# Patient Record
Sex: MALE | Race: BLACK OR AFRICAN AMERICAN | URBAN - METROPOLITAN AREA
[De-identification: names, ages, dates, MRNs, and addresses within clinical notes are randomized per-mention and may not be internally consistent; named-entity substitution may affect disease eponyms.]

---

## 2021-08-10 ENCOUNTER — HISTORICAL (OUTPATIENT)
Dept: ADMINISTRATIVE | Facility: HOSPITAL | Age: 26
End: 2021-08-10

## 2021-08-10 LAB — SARS-COV-2 RNA RESP QL NAA+PROBE: DETECTED

## 2024-06-13 ENCOUNTER — HOSPITAL ENCOUNTER (OUTPATIENT)
Dept: RADIOLOGY | Facility: HOSPITAL | Age: 29
Discharge: HOME | End: 2024-06-13
Attending: GENERAL PRACTICE
Payer: COMMERCIAL

## 2024-06-13 ENCOUNTER — OFFICE VISIT (OUTPATIENT)
Dept: INFECTIOUS DISEASES | Facility: CLINIC | Age: 29
End: 2024-06-13
Payer: COMMERCIAL

## 2024-06-13 ENCOUNTER — TELEPHONE (OUTPATIENT)
Dept: INFECTIOUS DISEASES | Facility: CLINIC | Age: 29
End: 2024-06-13

## 2024-06-13 VITALS
DIASTOLIC BLOOD PRESSURE: 84 MMHG | HEIGHT: 73 IN | SYSTOLIC BLOOD PRESSURE: 133 MMHG | WEIGHT: 221 LBS | HEART RATE: 80 BPM | RESPIRATION RATE: 18 BRPM | OXYGEN SATURATION: 98 % | BODY MASS INDEX: 29.29 KG/M2

## 2024-06-13 DIAGNOSIS — R05.9 COUGH, UNSPECIFIED TYPE: ICD-10-CM

## 2024-06-13 DIAGNOSIS — R05.8 SPUTUM PRODUCTION: ICD-10-CM

## 2024-06-13 DIAGNOSIS — Z21 NEWLY DIAGNOSED HIV-POSITIVE: Primary | ICD-10-CM

## 2024-06-13 DIAGNOSIS — Z11.3 ROUTINE SCREENING FOR STI (SEXUALLY TRANSMITTED INFECTION): ICD-10-CM

## 2024-06-13 DIAGNOSIS — Z21 NEWLY DIAGNOSED HIV-POSITIVE: ICD-10-CM

## 2024-06-13 DIAGNOSIS — B20 HIV INFECTION, UNSPECIFIED SYMPTOM STATUS: ICD-10-CM

## 2024-06-13 DIAGNOSIS — D69.6 THROMBOCYTOPENIA: Primary | ICD-10-CM

## 2024-06-13 PROCEDURE — 99205 OFFICE O/P NEW HI 60 MIN: CPT | Mod: S$GLB,,, | Performed by: GENERAL PRACTICE

## 2024-06-13 PROCEDURE — 3008F BODY MASS INDEX DOCD: CPT | Mod: CPTII,S$GLB,, | Performed by: GENERAL PRACTICE

## 2024-06-13 PROCEDURE — 1159F MED LIST DOCD IN RCRD: CPT | Mod: CPTII,S$GLB,, | Performed by: GENERAL PRACTICE

## 2024-06-13 PROCEDURE — 99999 PR PBB SHADOW E&M-EST. PATIENT-LVL IV: CPT | Mod: PBBFAC,,, | Performed by: GENERAL PRACTICE

## 2024-06-13 PROCEDURE — 71046 X-RAY EXAM CHEST 2 VIEWS: CPT | Mod: TC

## 2024-06-13 PROCEDURE — 87491 CHLMYD TRACH DNA AMP PROBE: CPT | Performed by: GENERAL PRACTICE

## 2024-06-13 PROCEDURE — 3079F DIAST BP 80-89 MM HG: CPT | Mod: CPTII,S$GLB,, | Performed by: GENERAL PRACTICE

## 2024-06-13 PROCEDURE — 3075F SYST BP GE 130 - 139MM HG: CPT | Mod: CPTII,S$GLB,, | Performed by: GENERAL PRACTICE

## 2024-06-13 RX ORDER — SULFAMETHOXAZOLE AND TRIMETHOPRIM 800; 160 MG/1; MG/1
1 TABLET ORAL
COMMUNITY
Start: 2024-05-28 | End: 2024-06-13 | Stop reason: ALTCHOICE

## 2024-06-13 RX ORDER — AZITHROMYCIN 250 MG/1
TABLET, FILM COATED ORAL
COMMUNITY
Start: 2024-05-23 | End: 2024-06-13 | Stop reason: ALTCHOICE

## 2024-06-13 RX ORDER — AMOXICILLIN 500 MG/1
TABLET, FILM COATED ORAL
COMMUNITY
End: 2024-06-13 | Stop reason: ALTCHOICE

## 2024-06-13 RX ORDER — PREDNISONE 20 MG/1
20 TABLET ORAL EVERY MORNING
COMMUNITY
Start: 2024-02-09 | End: 2024-06-13 | Stop reason: ALTCHOICE

## 2024-06-13 RX ORDER — DOXYCYCLINE HYCLATE 100 MG
TABLET ORAL
COMMUNITY
Start: 2024-02-09 | End: 2024-06-13 | Stop reason: ALTCHOICE

## 2024-06-13 RX ORDER — MUPIROCIN 20 MG/G
OINTMENT TOPICAL
COMMUNITY
End: 2024-06-13 | Stop reason: ALTCHOICE

## 2024-06-13 RX ORDER — AMOXICILLIN AND CLAVULANATE POTASSIUM 875; 125 MG/1; MG/1
TABLET, FILM COATED ORAL
COMMUNITY
Start: 2024-05-01 | End: 2024-06-13 | Stop reason: ALTCHOICE

## 2024-06-13 RX ORDER — METHYLPREDNISOLONE 4 MG/1
TABLET ORAL
COMMUNITY
Start: 2024-05-14 | End: 2024-06-13 | Stop reason: ALTCHOICE

## 2024-06-13 RX ORDER — OXYCODONE AND ACETAMINOPHEN 5; 325 MG/1; MG/1
1 TABLET ORAL
COMMUNITY
Start: 2024-05-20 | End: 2024-06-13 | Stop reason: ALTCHOICE

## 2024-06-13 NOTE — PROGRESS NOTES
Subjective:       Patient ID: Khris Covington 29 y.o.     Chief Complaint:   Chief Complaint   Patient presents with    NP/Referral...B20    HIV Positive/AIDS     Patient states he is fatigue, coughing, possible temp lastnight(temp unknown) and having body aches.         HPI:  06/13/2024  29-year-old male patient no significant past medical history presented to his outpatient primary care on 05/23/2024 after recurrent episodes of sinusitis and an ear infection in the past year, was seen for increased pressure and pain in the head and neck as well as clear liquid drip from the nose after having teeth pulled 05/20/2024, he had been recently noted to have pancytopenia on repeat labs, HIV was ordered, found to have a positive screen is referred to us for further evaluation and management.    Was previously tested in 03/2017 that was a negative test for concerns about his previous partner. In 2019, he had a rash on the genital area and had blood work at that time showed leukopenia and got tested for HIV for concerns about different partner, his test was negative then as well.     Has been with current wife for the past 4 years. Last time they had unprotected sex was about 3-4 weeks ago, wife was tested 2 weeks ago with negative test and again last week and awaiting results.     Works offshore. Smokes a cigarette or a cigar on rare occasions, drinks alcohol occasionally, no previous IVDU, no current drug use.     Previously had multiple female partners, no male partners.     Currently feeling fatigued, generalized pains, he has pain on the R lower jaw, had L sided lower teeth pulled. Ear pain has resolved. No sore throat, no shortness of breath, no chest pain, no burning with urination.      Has a cough, with mucus production with dark-green color, he is having diarrhea started this morning, loose stools, does also report diffuse joint pains, lost about 10 lbs in the past month but no weight loss in the past 6 months, has  "been having night sweats for the past 2-3 weeks.      He has been taking TMP/SMX daily for the past 2 weeks.    Past Medical History:   Diagnosis Date    ADHD (attention deficit hyperactivity disorder)     Anemia     HIV infection         History reviewed. No pertinent surgical history.     Social History     Socioeconomic History    Marital status:    Tobacco Use    Smoking status: Some Days     Types: Cigars    Smokeless tobacco: Former   Substance and Sexual Activity    Alcohol use: Never    Drug use: Never    Sexual activity: Yes        Family History   Problem Relation Name Age of Onset    Kidney disease Maternal Grandfather          Review of patient's allergies indicates:  No Known Allergies       There is no immunization history on file for this patient.     Review of Systems   All other systems reviewed and are negative.         Objective:      /84 (BP Location: Left arm, Patient Position: Sitting)   Pulse 80   Resp 18   Ht 6' 1" (1.854 m)   Wt 100.2 kg (221 lb)   SpO2 98%   BMI 29.16 kg/m²      Physical Exam  Constitutional:       Appearance: Normal appearance.   HENT:      Head: Normocephalic and atraumatic.      Mouth/Throat:      Pharynx: No oropharyngeal exudate or posterior oropharyngeal erythema.   Eyes:      Extraocular Movements: Extraocular movements intact.      Pupils: Pupils are equal, round, and reactive to light.   Cardiovascular:      Rate and Rhythm: Normal rate and regular rhythm.      Heart sounds: No murmur heard.  Pulmonary:      Effort: No respiratory distress.      Breath sounds: No wheezing, rhonchi or rales.   Abdominal:      General: Bowel sounds are normal. There is no distension.      Palpations: Abdomen is soft.      Tenderness: There is no abdominal tenderness. There is no right CVA tenderness or left CVA tenderness.   Musculoskeletal:         General: No swelling or tenderness.      Cervical back: Neck supple. No rigidity or tenderness.   Lymphadenopathy:    "   Cervical: No cervical adenopathy.   Skin:     Findings: No lesion or rash.   Neurological:      General: No focal deficit present.      Mental Status: He is alert and oriented to person, place, and time. Mental status is at baseline.      Cranial Nerves: No cranial nerve deficit.      Motor: No weakness.   Psychiatric:         Mood and Affect: Mood normal.         Behavior: Behavior normal.          Labs: Reviewed most recent relevant labs available, notable results highlighted in this note    Imaging: Reviewed most recent relevant imaging studies available, notable results highlighted in this note    Assessment:       Problem List Items Addressed This Visit          Pulmonary    Cough    Relevant Orders    CBC Auto Differential    Comprehensive Metabolic Panel (Completed)    CD4 T-[place] Cells    Hepatitis A antibody, IgG    Hepatitis A Antibody, IgM    Hepatitis B Core Antibody, IgM    Hepatitis B Core Antibody, Total    Hepatitis B Surface Ab, Qualitative    Hepatitis B Surface Antigen    Hepatitis C Antibody    HIV-1 RNA, Quantitative, PCR with Reflex to Genotype    Quantiferon Gold TB    SYPHILIS ANTIBODY (WITH REFLEX RPR) (Completed)    X-Ray Chest PA And Lateral (Completed)    Miscellaneous Test, Sendout PJP PCR    Fungitell Assay For (1.3)-B-D-Glucans    Cryptococcal antigen, blood    C.trach/N.gonor AMP RNA    C.trach/N.gonor AMP RNA    Respiratory Culture    Sputum production    Relevant Orders    CBC Auto Differential    Comprehensive Metabolic Panel (Completed)    CD4 T-[place] Cells    Hepatitis A antibody, IgG    Hepatitis A Antibody, IgM    Hepatitis B Core Antibody, IgM    Hepatitis B Core Antibody, Total    Hepatitis B Surface Ab, Qualitative    Hepatitis B Surface Antigen    Hepatitis C Antibody    HIV-1 RNA, Quantitative, PCR with Reflex to Genotype    Quantiferon Gold TB    SYPHILIS ANTIBODY (WITH REFLEX RPR) (Completed)    X-Ray Chest PA And Lateral (Completed)    Miscellaneous Test, Sendout  PJP PCR    Fungitell Assay For (1.3)-B-D-Glucans    Cryptococcal antigen, blood    C.trach/N.gonor AMP RNA    C.trach/N.gonor AMP RNA    Respiratory Culture       ID    Newly diagnosed HIV-positive - Primary    Relevant Medications    dvbjagsef-tdeazfbo-kqjlvln ala (BIKTARVY) -25 mg (25 kg or greater)    Other Relevant Orders    CBC Auto Differential    Comprehensive Metabolic Panel (Completed)    CD4 T-[place] Cells    Hepatitis A antibody, IgG    Hepatitis A Antibody, IgM    Hepatitis B Core Antibody, IgM    Hepatitis B Core Antibody, Total    Hepatitis B Surface Ab, Qualitative    Hepatitis B Surface Antigen    Hepatitis C Antibody    HIV-1 RNA, Quantitative, PCR with Reflex to Genotype    Quantiferon Gold TB    SYPHILIS ANTIBODY (WITH REFLEX RPR) (Completed)    X-Ray Chest PA And Lateral (Completed)    Miscellaneous Test, Sendout PJP PCR    Fungitell Assay For (1.3)-B-D-Glucans    Cryptococcal antigen, blood    C.trach/N.gonor AMP RNA    C.trach/N.gonor AMP RNA    Respiratory Culture    Routine screening for STI (sexually transmitted infection)    Relevant Orders    CBC Auto Differential    Comprehensive Metabolic Panel (Completed)    CD4 T-[place] Cells    Hepatitis A antibody, IgG    Hepatitis A Antibody, IgM    Hepatitis B Core Antibody, IgM    Hepatitis B Core Antibody, Total    Hepatitis B Surface Ab, Qualitative    Hepatitis B Surface Antigen    Hepatitis C Antibody    HIV-1 RNA, Quantitative, PCR with Reflex to Genotype    Quantiferon Gold TB    SYPHILIS ANTIBODY (WITH REFLEX RPR) (Completed)    X-Ray Chest PA And Lateral (Completed)    Miscellaneous Test, Sendout PJP PCR    Fungitell Assay For (1.3)-B-D-Glucans    C.trach/N.gonor AMP RNA    C.trach/N.gonor AMP RNA    Respiratory Culture    HIV infection          Plan:         Diagnosed: 05/2024  Risk Factor: unprotected sex with female partner     HIV VL: Pending  CD4: 98 05/2024  Shahab: 98 05/2024  ART: Not started yet  OI Prophylaxis: TMP/SMX per  primary care    Screenings: Labs repeated  Hep A:   Hep B:   Hep C: Negative 05/2024  Treponemal Ab: Negative 05/2024  RPR: N/a 05/2024  Urine G/C: Negative 05/2024  Throat G/C:   Rectal G/C:   Quantiferon:   Crypto Ag:   G6PD: N/A  HLA B*5701: N/A      Non-infectious screening:  Dexa-Scan: N/A  Lipid profile: N/a  HbA1c: N/A     Vaccinations: discussed, will initiate at next visit  Flu:   PPSV23:   PCV20:   TDap:   HPV:   Meningitis:   COVID-19:   Hepatitis A:   Hepatitis B:   VZV:   Monkeypox:     Other:  -discussed with the patient and his wife who is accompanying him today at length, details of HIV, pathophysiology, living well with HIV, importance of medication adherence, importance of obtaining undetectable levels, best ways to prevent spread of infection   -will obtain labs as above   -prescribed Biktarvy, will need repeat labs in 2-4 weeks after initiation of medication  -discussed potential side of    Needed at next visit:  -repeat labs, discussion above vaccination initiation of vaccination series    Counseling:   Counseled on safe sex practices and using protection at all times and disclosing status to all contacts  Counseled on importance of 100% adherence to medication and risk of resistance development        Follow up in about 4 weeks (around 7/11/2024).    Portions of this note dictated using EMR integrated voice recognition software, and may be subject to voice recognition errors not corrected at proofreading. Please contact writer for clarification if needed.    65 minutes of total time spent on the encounter, which includes face to face time and non-face to face time preparing to see the patient (eg, review of tests), Obtaining and/or reviewing separately obtained history, Documenting clinical information in the electronic or other health record, Independently interpreting results (not separately reported) and communicating results to the patient/family/caregiver, or Care coordination (not  separately reported).

## 2024-06-13 NOTE — TELEPHONE ENCOUNTER
Noted critical lab platelets of 97831, possible ITP in the setting of HIV infection.  Patient works offshore.  I called him back, discussed those results with him, discussed high risk of bleeding associated with this low-level of platelets.  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  Given his low platelets, although ITP is a possibility, other potential etiologies are possible/likely, including other opportunistic infections, waiting for additional testing that was sent, I would be concerned about his reaction to starting Biktarvy as well.  As long as cryptococcal antigen is negative and QuantiFERON is negative, would be reasonable to start ART, especially with a CD4 of 98. prior Auth has been initiated.  I stressed the importance of weekly labs while his platelets are this low and avoiding strenuous activities or activities with high potential for injury, presenting to care after any fall, especially if involving head trauma, and advised again against going back to work offshore until we have a better control on his current condition.

## 2024-06-14 LAB
C TRACH RRNA SPEC QL NAA+PROBE: NEGATIVE
N GONORRHOEA RRNA SPEC QL NAA+PROBE: NEGATIVE
SPECIMEN SOURCE: NORMAL
SPECIMEN SOURCE: NORMAL

## 2024-06-17 NOTE — PROGRESS NOTES
Subjective:       Patient ID: Khris Covington is a 29 y.o. male.    Chief Complaint: Follow-up (Patient is here for a new patient visit)      Referring Provider: Dr. Winston  Diagnosis: Pancytopenia      Clinical History:  Patient referred for thrombocytopenia. He presented to his PCP on 5/23/24 for recurrent sinusitis and ear infection over the past year. He was noted to have pancytopenia. Further workup, he was found to be HIV positive. He was referred to infectious disease.     He complains of fatigue, generalized myalgias. Patient denies male partners, IV drug use. He admits to constant cough with mucous production. He was prescribed Biktary on 6/13/24.   CBC reviewed from 6/13/24  Lab Results   Component Value Date    WBC 2.71 (L) 06/13/2024    HGB 12.1 (L) 06/13/2024    HCT 41.1 (L) 06/13/2024    MCV 76.1 (L) 06/13/2024    PLT 36 (LL) 06/13/2024           Interval History:   06/18/2024   Presents for initial consultation for pancytopenia.   Past Medical History:   Diagnosis Date    ADHD (attention deficit hyperactivity disorder)     Anemia     HIV infection     Newly diagnosed HIV-positive 06/13/2024      Past Surgical History:   Procedure Laterality Date    WISDOM TOOTH EXTRACTION       Social History     Socioeconomic History    Marital status:    Tobacco Use    Smoking status: Former     Types: Cigars    Smokeless tobacco: Former   Substance and Sexual Activity    Alcohol use: Yes     Comment: ocassional    Drug use: Never    Sexual activity: Yes      Family History   Problem Relation Name Age of Onset    Kidney disease Maternal Grandfather        Review of patient's allergies indicates:  No Known Allergies     Current Outpatient Medications:     glrkatlkl-opzpoasn-hhypnqm ala (BIKTARVY) -25 mg (25 kg or greater), Take 1 tablet by mouth once daily., Disp: 30 tablet, Rfl: 5    sulfamethoxazole-trimethoprim 800-160mg (BACTRIM DS) 800-160 mg Tab, Take 1 tablet by mouth every 12 (twelve) hours.,  Disp: , Rfl:         Review of Systems   Constitutional:  Negative for appetite change and unexpected weight change.   HENT:  Negative for mouth sores.    Eyes:  Negative for visual disturbance.   Respiratory:  Positive for cough. Negative for shortness of breath.    Cardiovascular:  Negative for chest pain.   Gastrointestinal:  Positive for diarrhea. Negative for abdominal pain.   Genitourinary:  Negative for frequency.   Musculoskeletal:  Negative for back pain.   Skin:  Negative for rash.   Neurological:  Negative for headaches.   Hematological:  Negative for adenopathy.   Psychiatric/Behavioral:  The patient is not nervous/anxious.     A complete 12 point ROS was performed in full with pertinent positives as described in interval history. Remainder of ROS done in full and are negative.        Objective:      There were no vitals filed for this visit.   Physical Exam  Constitutional:       Appearance: Normal appearance.   HENT:      Head: Normocephalic.      Nose: Nose normal.      Mouth/Throat:      Mouth: Mucous membranes are moist.   Eyes:      Extraocular Movements: Extraocular movements intact.      Pupils: Pupils are equal, round, and reactive to light.   Cardiovascular:      Rate and Rhythm: Normal rate and regular rhythm.   Pulmonary:      Effort: Pulmonary effort is normal.      Breath sounds: Normal breath sounds.   Abdominal:      General: Bowel sounds are normal.      Palpations: Abdomen is soft.   Musculoskeletal:         General: Normal range of motion.      Cervical back: Normal range of motion and neck supple.   Skin:     General: Skin is warm and dry.   Neurological:      General: No focal deficit present.      Mental Status: He is alert and oriented to person, place, and time. Mental status is at baseline.   Psychiatric:         Mood and Affect: Mood normal.         Behavior: Behavior normal.     LABS AND IMAGING REVIEWED IN [facility]  Lab Review:  Results for orders placed or performed in visit on  06/13/24   Cryptococcal antigen, blood    Specimen: Blood, Venous   Result Value Ref Range    Cryptococcal Antigen, Serum Negative Negative   C.trach/N.gonor AMP RNA   Result Value Ref Range    Source Throat     Chlamydia trachomatis amplified RNA Negative Negative    Neisseria gonorrhoeae amplified RNA Negative Negative    SOURCE: Throat    Respiratory Culture    Specimen: Sputum   Result Value Ref Range    Respiratory Culture Normal respiratory danna    Comprehensive Metabolic Panel   Result Value Ref Range    Sodium 137 136 - 145 mmol/L    Potassium 4.5 3.5 - 5.1 mmol/L    Chloride 103 98 - 107 mmol/L    CO2 28 22 - 29 mmol/L    Glucose 96 74 - 100 mg/dL    Blood Urea Nitrogen 17.3 8.9 - 20.6 mg/dL    Creatinine 1.03 0.73 - 1.18 mg/dL    Calcium 8.9 8.4 - 10.2 mg/dL    Protein Total 8.3 6.4 - 8.3 gm/dL    Albumin 3.5 3.5 - 5.0 g/dL    Globulin 4.8 (H) 2.4 - 3.5 gm/dL    Albumin/Globulin Ratio 0.7 (L) 1.1 - 2.0 ratio    Bilirubin Total 0.1 <=1.5 mg/dL    ALP 66 40 - 150 unit/L    ALT 20 0 - 55 unit/L    AST 26 5 - 34 unit/L    eGFR >60 mL/min/1.73/m2    Anion Gap 6.0 mEq/L    BUN/Creatinine Ratio 17    CD4 T-[place] Cells   Result Value Ref Range    CD45 Total Lymph Count 0.34 (L) 0.82 - 2.84 thou/mcL    % CD3 (T Cells) 68 58 - 86 %    CD3 (T Cells) 229 (L) 550 - 2202 cells/mcL    % CD4 (T Cells) 19 (L) 32 - 64 %    CD4 (T Cells) 65 (L) 365 - 1437 cells/mcL    % CD8 (T Cells) 46 (H) 18 - 40 %    CD8 (T Cells) 154 (L) 199 - 846 cells/mcL    4/8 Ratio 0.4 (L) >=0.9   Hepatitis A antibody, IgG   Result Value Ref Range    Hep A IgG Interp Reactive (A) Nonreactive   Hepatitis A Antibody, IgM   Result Value Ref Range    Hep A IgM Interp Nonreactive Nonreactive   Hepatitis B Core Antibody, IgM   Result Value Ref Range    Hep B Core IgM Interp Nonreactive Nonreactive   Hepatitis B Core Antibody, Total   Result Value Ref Range    Hep BcAb Interp Nonreactive Nonreactive   Hepatitis B Surface Ab, Qualitative   Result Value  Ref Range    Hep BsAb Interp Nonreactive Nonreactive    Hep BsAb 0.59 mIU/mL   Hepatitis B Surface Antigen   Result Value Ref Range    Hep BsAg Interp Nonreactive Nonreactive   Hepatitis C Antibody   Result Value Ref Range    Hep C Ab Interp Nonreactive Nonreactive   HIV-1 RNA, Quantitative, PCR with Reflex to Genotype   Result Value Ref Range    HIV-1 RNA Detect/Quant, P 848256 (A) Undetected copies/mL   Quantiferon Gold TB   Result Value Ref Range    QuantiFERON-Tb Gold Plus Result Negative Negative    TB1 Ag minus Nil Result 0.08 IU/mL    TB2 Ag minus Nil Result 0.11 IU/mL    Mitogen minus Nil Result 7.92 IU/mL    Nil Result 1.43 IU/mL   SYPHILIS ANTIBODY (WITH REFLEX RPR)   Result Value Ref Range    Syphilis Antibody Nonreactive Nonreactive, Equivocal   Fungitell Assay For (1.3)-B-D-Glucans   Result Value Ref Range    Fungitell Quantitative Value <31 <60 pg/mL pg/mL    Fungitell Qualitative Result Negative Negative   CBC with Differential   Result Value Ref Range    WBC 2.71 (L) 4.50 - 11.50 x10(3)/mcL    RBC 5.40 4.70 - 6.10 x10(6)/mcL    Hgb 12.1 (L) 14.0 - 18.0 g/dL    Hct 41.1 (L) 42.0 - 52.0 %    MCV 76.1 (L) 80.0 - 94.0 fL    MCH 22.4 (L) 27.0 - 31.0 pg    MCHC 29.4 (L) 33.0 - 36.0 g/dL    RDW 13.9 11.5 - 17.0 %    Platelet 36 (LL) 130 - 400 x10(3)/mcL    MPV      Neut % 65.3 %    Lymph % 16.6 %    Mono % 8.5 %    Eos % 9.2 %    Basophil % 0.4 %    Lymph # 0.45 (L) 0.6 - 4.6 x10(3)/mcL    Neut # 1.77 (L) 2.1 - 9.2 x10(3)/mcL    Mono # 0.23 0.1 - 1.3 x10(3)/mcL    Eos # 0.25 0 - 0.9 x10(3)/mcL    Baso # 0.01 <=0.2 x10(3)/mcL    IG# 0.00 0 - 0.04 x10(3)/mcL    IG% 0.0 %    NRBC% 0.0 %    IPF 8.5 0.9 - 11.2 %   Blood Smear Microscopic Exam   Result Value Ref Range    RBC Morph Abnormal (A) Normal    Platelets Decreased (A) Normal, Adequate          Assessment:   Pancytopenia  HIV/AIDS, recently started on Bkitarvy on 6/13/24. Followed by Dr. Winston.     He has know history of anemia in past--family with  trait  Ddx -discussed----HIV, autoimmune, other infectious etiologies, underlying bone marrow disorder, vitamin deficiencies   Bleeding discussed  IVIG  if  worse  Consider bone marrow biopsy      Plan:   Labs today  Orders Placed This Encounter    CBC Auto Differential    Ferritin    Folate    Iron and TIBC    Reticulocytes    Vitamin B12    Lactate Dehydrogenase    Platelet Autoantibody, Cell Bound    Hemoglobin Electrophoresis Evaluation, Blood    Path Review, Peripheral Smear      Follow up in about 6 weeks (around 7/30/2024).    If bleeding consider platelet transfusion and/or IVIG.  Await above workup and follow.  Expect recovery once his HIV recovers.    Risk of nonsteroidals.    Wait until platelets> 50,000 for elective procedures      Syed Arias MD      30 minutes was spent with the patient today, 15 minutes spent in reviewing his labs and outside records, 10 minutes in documentation

## 2024-06-18 ENCOUNTER — OFFICE VISIT (OUTPATIENT)
Dept: HEMATOLOGY/ONCOLOGY | Facility: CLINIC | Age: 29
End: 2024-06-18
Payer: COMMERCIAL

## 2024-06-18 DIAGNOSIS — R05.2 SUBACUTE COUGH: ICD-10-CM

## 2024-06-18 DIAGNOSIS — Z21 NEWLY DIAGNOSED HIV-POSITIVE: ICD-10-CM

## 2024-06-18 DIAGNOSIS — D61.818 PANCYTOPENIA: Primary | ICD-10-CM

## 2024-06-18 DIAGNOSIS — D69.6 THROMBOCYTOPENIA: ICD-10-CM

## 2024-06-18 LAB
BASOPHILS # BLD AUTO: 0.02 X10(3)/MCL
BASOPHILS NFR BLD AUTO: 0.9 %
EOSINOPHIL # BLD AUTO: 0.38 X10(3)/MCL (ref 0–0.9)
EOSINOPHIL NFR BLD AUTO: 17.1 %
ERYTHROCYTE [DISTWIDTH] IN BLOOD BY AUTOMATED COUNT: 13.5 % (ref 11.5–17)
FERRITIN SERPL-MCNC: 274.97 NG/ML (ref 21.81–274.66)
FOLATE SERPL-MCNC: 8.2 NG/ML (ref 7–31.4)
HCT VFR BLD AUTO: 36.7 % (ref 42–52)
HGB BLD-MCNC: 11 G/DL (ref 14–18)
IMM GRANULOCYTES # BLD AUTO: 0 X10(3)/MCL (ref 0–0.04)
IMM GRANULOCYTES NFR BLD AUTO: 0 %
IRON SATN MFR SERPL: 25 % (ref 20–50)
IRON SERPL-MCNC: 62 UG/DL (ref 65–175)
LDH SERPL-CCNC: 208 U/L (ref 125–220)
LYMPHOCYTES # BLD AUTO: 0.69 X10(3)/MCL (ref 0.6–4.6)
LYMPHOCYTES NFR BLD AUTO: 31.1 %
MCH RBC QN AUTO: 22.5 PG (ref 27–31)
MCHC RBC AUTO-ENTMCNC: 30 G/DL (ref 33–36)
MCV RBC AUTO: 75.1 FL (ref 80–94)
MONOCYTES # BLD AUTO: 0.34 X10(3)/MCL (ref 0.1–1.3)
MONOCYTES NFR BLD AUTO: 15.3 %
NEUTROPHILS # BLD AUTO: 0.79 X10(3)/MCL (ref 2.1–9.2)
NEUTROPHILS NFR BLD AUTO: 35.6 %
PLATELET # BLD AUTO: 139 X10(3)/MCL (ref 130–400)
PMV BLD AUTO: 8.8 FL (ref 7.4–10.4)
RBC # BLD AUTO: 4.89 X10(6)/MCL (ref 4.7–6.1)
RET# (OHS): 0.03 X10E6/UL (ref 0.03–0.1)
RETICULOCYTE COUNT AUTOMATED (OLG): 0.66 % (ref 1.1–2.1)
TIBC SERPL-MCNC: 191 UG/DL (ref 69–240)
TIBC SERPL-MCNC: 253 UG/DL (ref 250–450)
TRANSFERRIN SERPL-MCNC: 231 MG/DL (ref 174–364)
VIT B12 SERPL-MCNC: 502 PG/ML (ref 213–816)
WBC # BLD AUTO: 2.22 X10(3)/MCL (ref 4.5–11.5)

## 2024-06-18 PROCEDURE — 99204 OFFICE O/P NEW MOD 45 MIN: CPT | Mod: S$GLB,,, | Performed by: INTERNAL MEDICINE

## 2024-06-18 PROCEDURE — 85045 AUTOMATED RETICULOCYTE COUNT: CPT | Performed by: INTERNAL MEDICINE

## 2024-06-18 PROCEDURE — 82607 VITAMIN B-12: CPT | Performed by: INTERNAL MEDICINE

## 2024-06-18 PROCEDURE — 99999 PR PBB SHADOW E&M-EST. PATIENT-LVL III: CPT | Mod: PBBFAC,,, | Performed by: INTERNAL MEDICINE

## 2024-06-18 PROCEDURE — 86023 IMMUNOGLOBULIN ASSAY: CPT | Performed by: INTERNAL MEDICINE

## 2024-06-18 PROCEDURE — 1160F RVW MEDS BY RX/DR IN RCRD: CPT | Mod: CPTII,S$GLB,, | Performed by: INTERNAL MEDICINE

## 2024-06-18 PROCEDURE — 83020 HEMOGLOBIN ELECTROPHORESIS: CPT | Performed by: INTERNAL MEDICINE

## 2024-06-18 PROCEDURE — 85025 COMPLETE CBC W/AUTO DIFF WBC: CPT | Performed by: INTERNAL MEDICINE

## 2024-06-18 PROCEDURE — 83615 LACTATE (LD) (LDH) ENZYME: CPT | Performed by: INTERNAL MEDICINE

## 2024-06-18 PROCEDURE — 82746 ASSAY OF FOLIC ACID SERUM: CPT | Performed by: INTERNAL MEDICINE

## 2024-06-18 PROCEDURE — 36415 COLL VENOUS BLD VENIPUNCTURE: CPT | Performed by: INTERNAL MEDICINE

## 2024-06-18 PROCEDURE — 82728 ASSAY OF FERRITIN: CPT | Performed by: INTERNAL MEDICINE

## 2024-06-18 PROCEDURE — 1159F MED LIST DOCD IN RCRD: CPT | Mod: CPTII,S$GLB,, | Performed by: INTERNAL MEDICINE

## 2024-06-18 PROCEDURE — 83021 HEMOGLOBIN CHROMOTOGRAPHY: CPT | Performed by: INTERNAL MEDICINE

## 2024-06-18 PROCEDURE — 83540 ASSAY OF IRON: CPT | Performed by: INTERNAL MEDICINE

## 2024-06-18 RX ORDER — SULFAMETHOXAZOLE AND TRIMETHOPRIM 800; 160 MG/1; MG/1
1 TABLET ORAL
COMMUNITY

## 2024-06-19 LAB — HEMATOLOGIST REVIEW: NORMAL

## 2024-06-19 NOTE — PROGRESS NOTES
Please inform patient his platelets are normal now. Please start Biktarvy and repeat CBC, CMP, HIV viral load in 1-2 weeks after starting. Thank you

## 2024-06-20 LAB
HGB A MFR BLD ELPH: 97.6 % (ref 95.8–98)
HGB A2 MFR BLD ELPH: 2.4 % (ref 2–3.3)
HGB F MFR BLD ELPH: 0 % (ref 0–0.9)
HGB FRACT BLD ELPH-IMP: NORMAL
M HPLC HB VARIANT, B: NORMAL
PA IGG BLD QL FC: NEGATIVE
PA IGM BLD QL FC: POSITIVE

## 2024-06-24 ENCOUNTER — TELEPHONE (OUTPATIENT)
Dept: INFECTIOUS DISEASES | Facility: CLINIC | Age: 29
End: 2024-06-24
Payer: COMMERCIAL

## 2024-06-24 ENCOUNTER — LAB VISIT (OUTPATIENT)
Dept: LAB | Facility: HOSPITAL | Age: 29
End: 2024-06-24
Attending: GENERAL PRACTICE
Payer: COMMERCIAL

## 2024-06-24 DIAGNOSIS — Z21 NEWLY DIAGNOSED HIV-POSITIVE: ICD-10-CM

## 2024-06-24 DIAGNOSIS — Z21 NEWLY DIAGNOSED HIV-POSITIVE: Primary | ICD-10-CM

## 2024-06-24 LAB
ALBUMIN SERPL-MCNC: 3.7 G/DL (ref 3.5–5)
ALBUMIN/GLOB SERPL: 0.8 RATIO (ref 1.1–2)
ALP SERPL-CCNC: 68 UNIT/L (ref 40–150)
ALT SERPL-CCNC: 27 UNIT/L (ref 0–55)
ANION GAP SERPL CALC-SCNC: 7 MEQ/L
AST SERPL-CCNC: 21 UNIT/L (ref 5–34)
BASOPHILS # BLD AUTO: 0.03 X10(3)/MCL
BASOPHILS NFR BLD AUTO: 0.9 %
BILIRUB SERPL-MCNC: 0.2 MG/DL
BUN SERPL-MCNC: 16.8 MG/DL (ref 8.9–20.6)
CALCIUM SERPL-MCNC: 9.3 MG/DL (ref 8.4–10.2)
CHLORIDE SERPL-SCNC: 104 MMOL/L (ref 98–107)
CO2 SERPL-SCNC: 27 MMOL/L (ref 22–29)
CREAT SERPL-MCNC: 1.33 MG/DL (ref 0.73–1.18)
CREAT/UREA NIT SERPL: 13
EOSINOPHIL # BLD AUTO: 0.26 X10(3)/MCL (ref 0–0.9)
EOSINOPHIL NFR BLD AUTO: 8.2 %
ERYTHROCYTE [DISTWIDTH] IN BLOOD BY AUTOMATED COUNT: 14.4 % (ref 11.5–17)
GFR SERPLBLD CREATININE-BSD FMLA CKD-EPI: >60 ML/MIN/1.73/M2
GLOBULIN SER-MCNC: 4.4 GM/DL (ref 2.4–3.5)
GLUCOSE SERPL-MCNC: 92 MG/DL (ref 74–100)
HCT VFR BLD AUTO: 37.6 % (ref 42–52)
HGB BLD-MCNC: 11.5 G/DL (ref 14–18)
IMM GRANULOCYTES # BLD AUTO: 0.01 X10(3)/MCL (ref 0–0.04)
IMM GRANULOCYTES NFR BLD AUTO: 0.3 %
LYMPHOCYTES # BLD AUTO: 0.9 X10(3)/MCL (ref 0.6–4.6)
LYMPHOCYTES NFR BLD AUTO: 28.5 %
MCH RBC QN AUTO: 22.3 PG (ref 27–31)
MCHC RBC AUTO-ENTMCNC: 30.6 G/DL (ref 33–36)
MCV RBC AUTO: 72.9 FL (ref 80–94)
MONOCYTES # BLD AUTO: 0.3 X10(3)/MCL (ref 0.1–1.3)
MONOCYTES NFR BLD AUTO: 9.5 %
NEUTROPHILS # BLD AUTO: 1.66 X10(3)/MCL (ref 2.1–9.2)
NEUTROPHILS NFR BLD AUTO: 52.6 %
NRBC BLD AUTO-RTO: 0 %
PLATELET # BLD AUTO: 237 X10(3)/MCL (ref 130–400)
PMV BLD AUTO: 9.2 FL (ref 7.4–10.4)
POTASSIUM SERPL-SCNC: 4.3 MMOL/L (ref 3.5–5.1)
PROT SERPL-MCNC: 8.1 GM/DL (ref 6.4–8.3)
RBC # BLD AUTO: 5.16 X10(6)/MCL (ref 4.7–6.1)
SODIUM SERPL-SCNC: 138 MMOL/L (ref 136–145)
WBC # BLD AUTO: 3.16 X10(3)/MCL (ref 4.5–11.5)

## 2024-06-24 PROCEDURE — 87536 HIV-1 QUANT&REVRSE TRNSCRPJ: CPT

## 2024-06-24 PROCEDURE — 80053 COMPREHEN METABOLIC PANEL: CPT

## 2024-06-24 PROCEDURE — 36415 COLL VENOUS BLD VENIPUNCTURE: CPT

## 2024-06-24 PROCEDURE — 85025 COMPLETE CBC W/AUTO DIFF WBC: CPT

## 2024-06-24 NOTE — TELEPHONE ENCOUNTER
----- Message from Tru Winston MD sent at 6/24/2024  1:57 PM CDT -----  Please send a longer duration now, would not want him to run out of medication for any amount of time. Please make sure labs are done today and ordered as stat so they result same day. As for the note to return to work, reasonable with the information we have now but this could change depending on the lab results today.  ----- Message -----  From: Angelic Castillo MA  Sent: 6/24/2024  11:32 AM CDT  To: Tru Winston MD    Also, pt called back stating that he will run [place] meds while [place].. He will be out of meds for approximately 7 days.. on his next refill can we send in a 90 day script so that they do not run into this issue again?

## 2024-06-24 NOTE — PROGRESS NOTES
Please inform patient of labs, kidney function slightly worse but still acceptable, he needs to repeat at least CMP in 1-2 weeks to make sure it goes back to normal, he needs to remain very well hydrated

## 2024-06-26 LAB
HIV1 RNA SERPL NAA+PROBE-LOG#: DETECTED {LOG_COPIES}/ML
Lab: 512 IU/ML

## 2024-07-01 ENCOUNTER — LAB VISIT (OUTPATIENT)
Dept: LAB | Facility: HOSPITAL | Age: 29
End: 2024-07-01
Attending: GENERAL PRACTICE
Payer: COMMERCIAL

## 2024-07-01 DIAGNOSIS — D69.6 THROMBOCYTOPENIA: ICD-10-CM

## 2024-07-01 LAB
BASOPHILS # BLD AUTO: 0.01 X10(3)/MCL
BASOPHILS NFR BLD AUTO: 0.3 %
EOSINOPHIL # BLD AUTO: 0.28 X10(3)/MCL (ref 0–0.9)
EOSINOPHIL NFR BLD AUTO: 8.2 %
ERYTHROCYTE [DISTWIDTH] IN BLOOD BY AUTOMATED COUNT: 14.4 % (ref 11.5–17)
HCT VFR BLD AUTO: 40.2 % (ref 42–52)
HGB BLD-MCNC: 11.9 G/DL (ref 14–18)
IMM GRANULOCYTES # BLD AUTO: 0 X10(3)/MCL (ref 0–0.04)
IMM GRANULOCYTES NFR BLD AUTO: 0 %
LYMPHOCYTES # BLD AUTO: 0.74 X10(3)/MCL (ref 0.6–4.6)
LYMPHOCYTES NFR BLD AUTO: 21.6 %
MCH RBC QN AUTO: 22.4 PG (ref 27–31)
MCHC RBC AUTO-ENTMCNC: 29.6 G/DL (ref 33–36)
MCV RBC AUTO: 75.6 FL (ref 80–94)
MONOCYTES # BLD AUTO: 0.35 X10(3)/MCL (ref 0.1–1.3)
MONOCYTES NFR BLD AUTO: 10.2 %
NEUTROPHILS # BLD AUTO: 2.04 X10(3)/MCL (ref 2.1–9.2)
NEUTROPHILS NFR BLD AUTO: 59.7 %
NRBC BLD AUTO-RTO: 0 %
PLATELET # BLD AUTO: 146 X10(3)/MCL (ref 130–400)
PLATELETS.RETICULATED NFR BLD AUTO: 4.2 % (ref 0.9–11.2)
PMV BLD AUTO: 10.7 FL (ref 7.4–10.4)
RBC # BLD AUTO: 5.32 X10(6)/MCL (ref 4.7–6.1)
WBC # BLD AUTO: 3.42 X10(3)/MCL (ref 4.5–11.5)

## 2024-07-01 PROCEDURE — 36415 COLL VENOUS BLD VENIPUNCTURE: CPT

## 2024-07-01 PROCEDURE — 85025 COMPLETE CBC W/AUTO DIFF WBC: CPT

## 2024-07-26 NOTE — PROGRESS NOTES
Subjective:       Patient ID: Khris Covington is a 29 y.o. male.    Chief Complaint: Follow-up (Patient is here for his 6 week visit)      Referring Provider: Dr. Winston  Diagnosis: Pancytopenia      Clinical History:  Patient referred for thrombocytopenia. He presented to his PCP on 5/23/24 for recurrent sinusitis and ear infection over the past year. He was noted to have pancytopenia. Further workup, he was found to be HIV positive. He was referred to infectious disease.   He complains of fatigue, generalized myalgias. Patient denies male partners, IV drug use. He admits to constant cough with mucous production. He was prescribed Biktary on 6/13/24.   CBC reviewed from 6/13/24  Lab Results   Component Value Date    WBC 2.47 (L) 07/29/2024    HGB 11.8 (L) 07/29/2024    HCT 40.1 (L) 07/29/2024    MCV 75.7 (L) 07/29/2024     07/29/2024     Interval History:   08/01/2024   Presents for initial consultation for pancytopenia.    Appointment 9/26/24 Dr. Winston.    She denies any fever chills night sweats or weight loss.  No rash.  No nausea or vomiting.  Tolerating his medicine well no signs of bruising or bleeding    Past Medical History:   Diagnosis Date    ADHD (attention deficit hyperactivity disorder)     Anemia     HIV infection     Newly diagnosed HIV-positive 06/13/2024      Past Surgical History:   Procedure Laterality Date    WISDOM TOOTH EXTRACTION       Social History     Socioeconomic History    Marital status:    Tobacco Use    Smoking status: Former     Types: Cigars    Smokeless tobacco: Former   Substance and Sexual Activity    Alcohol use: Yes     Comment: ocassional    Drug use: Never    Sexual activity: Yes      Family History   Problem Relation Name Age of Onset    Kidney disease Maternal Grandfather        Review of patient's allergies indicates:  No Known Allergies     Current Outpatient Medications:     vzbvmtqok-myyltsvm-juzkdcm ala (BIKTARVY) -25 mg (25 kg or greater),  Take 1 tablet by mouth once daily., Disp: 90 tablet, Rfl: 1    sulfamethoxazole-trimethoprim 800-160mg (BACTRIM DS) 800-160 mg Tab, Take 1 tablet by mouth 2 (two) times daily., Disp: 180 tablet, Rfl: 1        Review of Systems   A complete 12 point ROS was performed in full with pertinent positives as described in interval history. Remainder of ROS done in full and are negative.        Objective:      Vitals:    08/01/24 0921   BP: 115/77   Pulse: (!) 57   Temp: 97.9 °F (36.6 °C)      Physical Exam  Constitutional:       Appearance: Normal appearance.   HENT:      Head: Normocephalic.      Nose: Nose normal.      Mouth/Throat:      Mouth: Mucous membranes are moist.   Eyes:      Extraocular Movements: Extraocular movements intact.      Pupils: Pupils are equal, round, and reactive to light.   Cardiovascular:      Rate and Rhythm: Normal rate and regular rhythm.   Pulmonary:      Effort: Pulmonary effort is normal.      Breath sounds: Normal breath sounds.   Abdominal:      General: Bowel sounds are normal.      Palpations: Abdomen is soft.   Musculoskeletal:         General: Normal range of motion.      Cervical back: Normal range of motion and neck supple.   Skin:     General: Skin is warm and dry.   Neurological:      General: No focal deficit present.      Mental Status: He is alert and oriented to person, place, and time. Mental status is at baseline.   Psychiatric:         Mood and Affect: Mood normal.         Behavior: Behavior normal.       LABS AND IMAGING REVIEWED IN [facility]  Lab Review:  Results for orders placed or performed in visit on 07/29/24   Comprehensive Metabolic Panel   Result Value Ref Range    Sodium 142 136 - 145 mmol/L    Potassium 4.4 3.5 - 5.1 mmol/L    Chloride 105 98 - 107 mmol/L    CO2 30 (H) 22 - 29 mmol/L    Glucose 90 74 - 100 mg/dL    Blood Urea Nitrogen 11.2 8.9 - 20.6 mg/dL    Creatinine 1.02 0.73 - 1.18 mg/dL    Calcium 9.5 8.4 - 10.2 mg/dL    Protein Total 7.5 6.4 - 8.3 gm/dL     Albumin 3.8 3.5 - 5.0 g/dL    Globulin 3.7 (H) 2.4 - 3.5 gm/dL    Albumin/Globulin Ratio 1.0 (L) 1.1 - 2.0 ratio    Bilirubin Total 0.3 <=1.5 mg/dL    ALP 66 40 - 150 unit/L    ALT 13 0 - 55 unit/L    AST 18 5 - 34 unit/L    eGFR >60 mL/min/1.73/m2    Anion Gap 7.0 mEq/L    BUN/Creatinine Ratio 11    HIV RNA, Quantitative, PCR   Result Value Ref Range    HIV-1 RNA, Qualitative Detected but not quantified (A) Not Detected   Hemoglobin A1C   Result Value Ref Range    Hemoglobin A1c 5.3 <=7.0 %    Estimated Average Glucose 105.4 mg/dL   CBC with Differential   Result Value Ref Range    WBC 2.47 (L) 4.50 - 11.50 x10(3)/mcL    RBC 5.30 4.70 - 6.10 x10(6)/mcL    Hgb 11.8 (L) 14.0 - 18.0 g/dL    Hct 40.1 (L) 42.0 - 52.0 %    MCV 75.7 (L) 80.0 - 94.0 fL    MCH 22.3 (L) 27.0 - 31.0 pg    MCHC 29.4 (L) 33.0 - 36.0 g/dL    RDW 15.0 11.5 - 17.0 %    Platelet 195 130 - 400 x10(3)/mcL    MPV 9.8 7.4 - 10.4 fL    Neut % 45.3 %    Lymph % 30.8 %    Mono % 10.1 %    Eos % 12.6 %    Basophil % 1.2 %    Lymph # 0.76 0.6 - 4.6 x10(3)/mcL    Neut # 1.12 (L) 2.1 - 9.2 x10(3)/mcL    Mono # 0.25 0.1 - 1.3 x10(3)/mcL    Eos # 0.31 0 - 0.9 x10(3)/mcL    Baso # 0.03 <=0.2 x10(3)/mcL    IG# 0.00 0 - 0.04 x10(3)/mcL    IG% 0.0 %    NRBC% 0.0 %   CD4 T-[place] Cells   Result Value Ref Range    CD45 Total Lymph Count 0.70 (L) 0.82 - 2.84 thou/mcL    % CD3 (T Cells) 69 58 - 86 %    CD3 (T Cells) 486 (L) 550 - 2202 cells/mcL    % CD4 (T Cells) 26 (L) 32 - 64 %    CD4 (T Cells) 183 (L) 365 - 1437 cells/mcL    % CD8 (T Cells) 40 18 - 40 %    CD8 (T Cells) 281 199 - 846 cells/mcL    4/8 Ratio 0.7 (L) >=0.9      Lab Review:           Assessment:   Pancytopenia  HIV/AIDS, recently started on Bkitarvy on 6/13/24. Followed by Dr. Winston.   He has know history of anemia in past--family with trait  Ddx -discussed----HIV, autoimmune, other infectious etiologies, underlying bone marrow disorder, vitamin deficiencBleeding discussed  3.  Microcytosis most likely  secondary to thalassemia trait  4. 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  This is most likely a trait.   However he has progressive microcytosis or anemia, then re-evaluation to his microcytosis can be obtained           ( Stool., repeat iron studies and ferritin, peripheral blood smear, retic)  He does not need iron.    He understands not to take iron.    His thrombocytopenia was most likely idiopathic secondary to his HIV with his positive antibody---this has recovered       Follow up if needed by MED team.     Syed Arias MD

## 2024-07-29 ENCOUNTER — LAB VISIT (OUTPATIENT)
Dept: LAB | Facility: HOSPITAL | Age: 29
End: 2024-07-29
Payer: COMMERCIAL

## 2024-07-29 ENCOUNTER — OFFICE VISIT (OUTPATIENT)
Dept: INFECTIOUS DISEASES | Facility: CLINIC | Age: 29
End: 2024-07-29
Payer: COMMERCIAL

## 2024-07-29 VITALS
DIASTOLIC BLOOD PRESSURE: 78 MMHG | OXYGEN SATURATION: 100 % | HEIGHT: 73 IN | SYSTOLIC BLOOD PRESSURE: 133 MMHG | BODY MASS INDEX: 30.09 KG/M2 | RESPIRATION RATE: 18 BRPM | WEIGHT: 227 LBS | TEMPERATURE: 98 F | HEART RATE: 64 BPM

## 2024-07-29 DIAGNOSIS — Z21 NEWLY DIAGNOSED HIV-POSITIVE: ICD-10-CM

## 2024-07-29 DIAGNOSIS — B20 HIV INFECTION, UNSPECIFIED SYMPTOM STATUS: ICD-10-CM

## 2024-07-29 DIAGNOSIS — R05.9 COUGH, UNSPECIFIED TYPE: ICD-10-CM

## 2024-07-29 DIAGNOSIS — R05.8 SPUTUM PRODUCTION: ICD-10-CM

## 2024-07-29 DIAGNOSIS — B20 HIV INFECTION, UNSPECIFIED SYMPTOM STATUS: Primary | ICD-10-CM

## 2024-07-29 LAB
ALBUMIN SERPL-MCNC: 3.8 G/DL (ref 3.5–5)
ALBUMIN/GLOB SERPL: 1 RATIO (ref 1.1–2)
ALP SERPL-CCNC: 66 UNIT/L (ref 40–150)
ALT SERPL-CCNC: 13 UNIT/L (ref 0–55)
ANION GAP SERPL CALC-SCNC: 7 MEQ/L
AST SERPL-CCNC: 18 UNIT/L (ref 5–34)
BASOPHILS # BLD AUTO: 0.03 X10(3)/MCL
BASOPHILS NFR BLD AUTO: 1.2 %
BILIRUB SERPL-MCNC: 0.3 MG/DL
BUN SERPL-MCNC: 11.2 MG/DL (ref 8.9–20.6)
CALCIUM SERPL-MCNC: 9.5 MG/DL (ref 8.4–10.2)
CHLORIDE SERPL-SCNC: 105 MMOL/L (ref 98–107)
CO2 SERPL-SCNC: 30 MMOL/L (ref 22–29)
CREAT SERPL-MCNC: 1.02 MG/DL (ref 0.73–1.18)
CREAT/UREA NIT SERPL: 11
EOSINOPHIL # BLD AUTO: 0.31 X10(3)/MCL (ref 0–0.9)
EOSINOPHIL NFR BLD AUTO: 12.6 %
ERYTHROCYTE [DISTWIDTH] IN BLOOD BY AUTOMATED COUNT: 15 % (ref 11.5–17)
EST. AVERAGE GLUCOSE BLD GHB EST-MCNC: 105.4 MG/DL
GFR SERPLBLD CREATININE-BSD FMLA CKD-EPI: >60 ML/MIN/1.73/M2
GLOBULIN SER-MCNC: 3.7 GM/DL (ref 2.4–3.5)
GLUCOSE SERPL-MCNC: 90 MG/DL (ref 74–100)
HBA1C MFR BLD: 5.3 %
HCT VFR BLD AUTO: 40.1 % (ref 42–52)
HGB BLD-MCNC: 11.8 G/DL (ref 14–18)
IMM GRANULOCYTES # BLD AUTO: 0 X10(3)/MCL (ref 0–0.04)
IMM GRANULOCYTES NFR BLD AUTO: 0 %
LYMPHOCYTES # BLD AUTO: 0.76 X10(3)/MCL (ref 0.6–4.6)
LYMPHOCYTES NFR BLD AUTO: 30.8 %
MCH RBC QN AUTO: 22.3 PG (ref 27–31)
MCHC RBC AUTO-ENTMCNC: 29.4 G/DL (ref 33–36)
MCV RBC AUTO: 75.7 FL (ref 80–94)
MONOCYTES # BLD AUTO: 0.25 X10(3)/MCL (ref 0.1–1.3)
MONOCYTES NFR BLD AUTO: 10.1 %
NEUTROPHILS # BLD AUTO: 1.12 X10(3)/MCL (ref 2.1–9.2)
NEUTROPHILS NFR BLD AUTO: 45.3 %
NRBC BLD AUTO-RTO: 0 %
PLATELET # BLD AUTO: 195 X10(3)/MCL (ref 130–400)
PMV BLD AUTO: 9.8 FL (ref 7.4–10.4)
POTASSIUM SERPL-SCNC: 4.4 MMOL/L (ref 3.5–5.1)
PROT SERPL-MCNC: 7.5 GM/DL (ref 6.4–8.3)
RBC # BLD AUTO: 5.3 X10(6)/MCL (ref 4.7–6.1)
SODIUM SERPL-SCNC: 142 MMOL/L (ref 136–145)
WBC # BLD AUTO: 2.47 X10(3)/MCL (ref 4.5–11.5)

## 2024-07-29 PROCEDURE — 99215 OFFICE O/P EST HI 40 MIN: CPT | Mod: S$GLB,,,

## 2024-07-29 PROCEDURE — 3008F BODY MASS INDEX DOCD: CPT | Mod: CPTII,S$GLB,,

## 2024-07-29 PROCEDURE — 80053 COMPREHEN METABOLIC PANEL: CPT

## 2024-07-29 PROCEDURE — 36415 COLL VENOUS BLD VENIPUNCTURE: CPT

## 2024-07-29 PROCEDURE — 86360 T CELL ABSOLUTE COUNT/RATIO: CPT

## 2024-07-29 PROCEDURE — 86359 T CELLS TOTAL COUNT: CPT

## 2024-07-29 PROCEDURE — 99999 PR PBB SHADOW E&M-EST. PATIENT-LVL III: CPT | Mod: PBBFAC,,,

## 2024-07-29 PROCEDURE — 3075F SYST BP GE 130 - 139MM HG: CPT | Mod: CPTII,S$GLB,,

## 2024-07-29 PROCEDURE — 87536 HIV-1 QUANT&REVRSE TRNSCRPJ: CPT

## 2024-07-29 PROCEDURE — 3078F DIAST BP <80 MM HG: CPT | Mod: CPTII,S$GLB,,

## 2024-07-29 PROCEDURE — 83036 HEMOGLOBIN GLYCOSYLATED A1C: CPT

## 2024-07-29 PROCEDURE — 3044F HG A1C LEVEL LT 7.0%: CPT | Mod: CPTII,S$GLB,,

## 2024-07-29 PROCEDURE — 85025 COMPLETE CBC W/AUTO DIFF WBC: CPT

## 2024-07-29 RX ORDER — SULFAMETHOXAZOLE AND TRIMETHOPRIM 800; 160 MG/1; MG/1
1 TABLET ORAL 2 TIMES DAILY
Qty: 180 TABLET | Refills: 1 | Status: SHIPPED | OUTPATIENT
Start: 2024-07-29

## 2024-07-29 NOTE — PROGRESS NOTES
Subjective:       Patient ID: Khris Covington 29 y.o.     Chief Complaint:   Chief Complaint   Patient presents with    Follow-up     1 month follow up/B20        HPI:  06/13/2024  29-year-old male patient no significant past medical history presented to his outpatient primary care on 05/23/2024 after recurrent episodes of sinusitis and an ear infection in the past year, was seen for increased pressure and pain in the head and neck as well as clear liquid drip from the nose after having teeth pulled 05/20/2024, he had been recently noted to have pancytopenia on repeat labs, HIV was ordered, found to have a positive screen is referred to us for further evaluation and management.    Was previously tested in 03/2017 that was a negative test for concerns about his previous partner. In 2019, he had a rash on the genital area and had blood work at that time showed leukopenia and got tested for HIV for concerns about different partner, his test was negative then as well.     Has been with current wife for the past 4 years. Last time they had unprotected sex was about 3-4 weeks ago, wife was tested 2 weeks ago with negative test and again last week and awaiting results.     Works offshore. Smokes a cigarette or a cigar on rare occasions, drinks alcohol occasionally, no previous IVDU, no current drug use.     Previously had multiple female partners, no male partners.     Currently feeling fatigued, generalized pains, he has pain on the R lower jaw, had L sided lower teeth pulled. Ear pain has resolved. No sore throat, no shortness of breath, no chest pain, no burning with urination.      Has a cough, with mucus production with dark-green color, he is having diarrhea started this morning, loose stools, does also report diffuse joint pains, lost about 10 lbs in the past month but no weight loss in the past 6 months, has been having night sweats for the past 2-3 weeks.      He has been taking TMP/SMX daily for the past 2  "weeks.    07/29/2024 office visit:   Mr. Covington presents for follow-up today accompanied by his wife. He began taking Biktarvy on 06/24/2024. He denies any fever, chills, or night sweats. He denies any nausea or vomiting. He reports that he did have diarrhea upon beginning Biktarvy, but this has since resolved. He denies any shortness of breath. He does endorse having thick green mucous, especially in the mornings. 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 He does report that he missed two doses of Biktarvy due to running out of medication. His wife began taking PrEP through [facility].     Past Medical History:   Diagnosis Date    ADHD (attention deficit hyperactivity disorder)     Anemia     HIV infection     Newly diagnosed HIV-positive 06/13/2024        Past Surgical History:   Procedure Laterality Date    WISDOM TOOTH EXTRACTION          Social History     Socioeconomic History    Marital status:    Tobacco Use    Smoking status: Former     Types: Cigars    Smokeless tobacco: Former   Substance and Sexual Activity    Alcohol use: Yes     Comment: ocassional    Drug use: Never    Sexual activity: Yes        Family History   Problem Relation Name Age of Onset    Kidney disease Maternal Grandfather          Review of patient's allergies indicates:  No Known Allergies       There is no immunization history on file for this patient.     Review of Systems   All other systems reviewed and are negative.         Objective:      /78 (BP Location: Left arm, Patient Position: Sitting)   Pulse 64   Temp 98 °F (36.7 °C) (Oral)   Resp 18   Ht 6' 1" (1.854 m)   Wt 103 kg (227 lb)   SpO2 100%   BMI 29.95 kg/m²      Physical Exam  Constitutional:       Appearance: Normal appearance.   HENT:      Head: Normocephalic and atraumatic.      Mouth/Throat:      Pharynx: No oropharyngeal exudate or posterior oropharyngeal erythema.   Eyes:      Extraocular Movements: " Extraocular movements intact.      Pupils: Pupils are equal, round, and reactive to light.   Cardiovascular:      Rate and Rhythm: Normal rate and regular rhythm.      Heart sounds: No murmur heard.  Pulmonary:      Effort: No respiratory distress.      Breath sounds: No wheezing, rhonchi or rales.   Abdominal:      General: Bowel sounds are normal. There is no distension.      Palpations: Abdomen is soft.      Tenderness: There is no abdominal tenderness. There is no right CVA tenderness or left CVA tenderness.   Musculoskeletal:         General: No swelling or tenderness.      Cervical back: Neck supple. No rigidity or tenderness.   Lymphadenopathy:      Cervical: No cervical adenopathy.   Skin:     Findings: No lesion or rash.   Neurological:      General: No focal deficit present.      Mental Status: He is alert and oriented to person, place, and time. Mental status is at baseline.      Cranial Nerves: No cranial nerve deficit.      Motor: No weakness.   Psychiatric:         Mood and Affect: Mood normal.         Behavior: Behavior normal.          Labs: Reviewed most recent relevant labs available, notable results highlighted in this note    Imaging: Reviewed most recent relevant imaging studies available, notable results highlighted in this note    Assessment:       Problem List Items Addressed This Visit          Pulmonary    Cough    Relevant Orders    Miscellaneous Test, Sendout sputum; [HNS3940] (Order [id number])    Sputum production    Relevant Orders    Miscellaneous Test, Sendout sputum; [[id number]] (Order [id number])       ID    Newly diagnosed HIV-positive    Relevant Orders    CBC Auto Differential (Completed)    Comprehensive Metabolic Panel (Completed)    HIV RNA, Quantitative, PCR (Completed)    CD4 Lymphocytes    Hemoglobin A1C (Completed)    Lipid Panel    Miscellaneous Test, Sendout sputum; [[id number]] (Order [id number])    HIV infection - Primary    Relevant Orders    CBC Auto Differential  (Completed)    Comprehensive Metabolic Panel (Completed)    HIV RNA, Quantitative, PCR (Completed)    CD4 Lymphocytes    Hemoglobin A1C (Completed)    Lipid Panel    Miscellaneous Test, Sendout sputum; [[id number]] (Order [id number])            Plan:         Diagnosed: 05/2024  Risk Factor: unprotected sex with female partner     HIV VL: 478269 on 06/13/2024, 512 on 06/24/2024; will repeat   CD4: 98 05/2024, 65 on 06/13/2024; will repeat   Shahab: 98 05/2024, 65 on 06/13/2024  ART:  Biktarvy started on 06/24/2024  OI Prophylaxis: TMP/SMX     Screenings: Labs repeated  Hep A:  Immune on 06/13/2024  Hep B:  Nonimmune, no active infection on 06/13/2024  Hep C: Negative 06/13/2024  Treponemal Ab: Negative 06/13/2024  RPR: N/a 06/13/2024  Urine G/C: Negative 05/2024  Throat G/C:  Negative on 06/13/2024  Rectal G/C:  Negative on 06/13/2024  Quantiferon:  Negative 06/13/2024  Crypto Ag:  Negative on 06/13/2024  G6PD: N/A  HLA B*5701: N/A      Non-infectious screening:  Dexa-Scan: N/A  Lipid profile: N/a; will obtain   HbA1c: N/A; will obtain     Vaccinations: discussed, will initiate at next visit  Flu:   PPSV23:   PCV20:   TDap:   HPV:   Meningitis:   COVID-19: 10/01/2021 and 11/04/2021  Hepatitis A:   Hepatitis B:   VZV:   Monkeypox:     Other:  -initiation of vaccination series at next visit   -discussed need for OI with TMP/SMX. Educated patient on the importance of not stopping medication until instructed by ID provider. Will prescribe 90 day supply in order to lessen the possibility of missed doses due to running out of medication while [place]  -Discussed the importance of not skipping doses of Biktarvy s/t the possibility of viral resistance. Will also order 90 day supply.   -Will obtain repeat CD4, HIV VL, PJP PCR and remainder of intake labs.       Needed at next visit:  -repeat labs, discussion above vaccination initiation of vaccination series    Counseling:   Counseled on safe sex practices and using protection  at all times and disclosing status to all contacts  Counseled on importance of 100% adherence to medication and risk of resistance development      Follow up in about 8 weeks (around 9/23/2024).    Portions of this note dictated using EMR integrated voice recognition software, and may be subject to voice recognition errors not corrected at proofreading. Please contact writer for clarification if needed.    52 minutes of total time spent on the encounter, which includes face to face time and non-face to face time preparing to see the patient (eg, review of tests), Obtaining and/or reviewing separately obtained history, Documenting clinical information in the electronic or other health record, Independently interpreting results (not separately reported) and communicating results to the patient/family/caregiver, or Care coordination (not separately reported).

## 2024-07-30 LAB
CD3 CELLS # BLD: 486 CELLS/MCL (ref 550–2202)
CD3 CELLS NFR BLD: 69 % (ref 58–86)
CD3+CD4+ CELLS # BLD: 183 CELLS/MCL (ref 365–1437)
CD3+CD4+ CELLS NFR BLD: 26 % (ref 32–64)
CD3+CD4+ CELLS/CD3+CD8+ CLL BLD: 0.7 %
CD3+CD8+ CELLS # BLD: 281 CELLS/MCL (ref 199–846)
CD3+CD8+ CELLS NFR BLD: 40 % (ref 18–40)
CD45 CELLS # BLD: 0.7 THOU/MCL (ref 0.82–2.84)
HIV1 RNA SERPL NAA+PROBE-LOG#: ABNORMAL {LOG_COPIES}/ML

## 2024-07-31 PROBLEM — D61.818 PANCYTOPENIA: Status: ACTIVE | Noted: 2024-07-31

## 2024-07-31 PROBLEM — D69.6 THROMBOCYTOPENIA: Status: ACTIVE | Noted: 2024-07-31

## 2024-08-01 ENCOUNTER — OFFICE VISIT (OUTPATIENT)
Dept: HEMATOLOGY/ONCOLOGY | Facility: CLINIC | Age: 29
End: 2024-08-01
Payer: COMMERCIAL

## 2024-08-01 ENCOUNTER — LAB VISIT (OUTPATIENT)
Dept: LAB | Facility: HOSPITAL | Age: 29
End: 2024-08-01
Payer: COMMERCIAL

## 2024-08-01 VITALS
WEIGHT: 224.19 LBS | OXYGEN SATURATION: 99 % | DIASTOLIC BLOOD PRESSURE: 77 MMHG | HEART RATE: 57 BPM | TEMPERATURE: 98 F | BODY MASS INDEX: 29.71 KG/M2 | HEIGHT: 73 IN | SYSTOLIC BLOOD PRESSURE: 115 MMHG

## 2024-08-01 DIAGNOSIS — Z21 NEWLY DIAGNOSED HIV-POSITIVE: ICD-10-CM

## 2024-08-01 DIAGNOSIS — R05.9 COUGH, UNSPECIFIED TYPE: ICD-10-CM

## 2024-08-01 DIAGNOSIS — D61.818 PANCYTOPENIA: Primary | ICD-10-CM

## 2024-08-01 DIAGNOSIS — B20 HIV INFECTION, UNSPECIFIED SYMPTOM STATUS: ICD-10-CM

## 2024-08-01 DIAGNOSIS — D69.6 THROMBOCYTOPENIA: ICD-10-CM

## 2024-08-01 DIAGNOSIS — R05.8 SPUTUM PRODUCTION: ICD-10-CM

## 2024-08-01 LAB
CHOLEST SERPL-MCNC: 174 MG/DL
CHOLEST/HDLC SERPL: 4 {RATIO} (ref 0–5)
HDLC SERPL-MCNC: 43 MG/DL (ref 35–60)
LDLC SERPL CALC-MCNC: 117 MG/DL (ref 50–140)
TRIGL SERPL-MCNC: 71 MG/DL (ref 34–140)
VLDLC SERPL CALC-MCNC: 14 MG/DL

## 2024-08-01 PROCEDURE — 99213 OFFICE O/P EST LOW 20 MIN: CPT | Mod: S$GLB,,, | Performed by: INTERNAL MEDICINE

## 2024-08-01 PROCEDURE — 99999 PR PBB SHADOW E&M-EST. PATIENT-LVL III: CPT | Mod: PBBFAC,,, | Performed by: INTERNAL MEDICINE

## 2024-08-01 PROCEDURE — 1159F MED LIST DOCD IN RCRD: CPT | Mod: CPTII,S$GLB,, | Performed by: INTERNAL MEDICINE

## 2024-08-01 PROCEDURE — 3074F SYST BP LT 130 MM HG: CPT | Mod: CPTII,S$GLB,, | Performed by: INTERNAL MEDICINE

## 2024-08-01 PROCEDURE — 87798 DETECT AGENT NOS DNA AMP: CPT

## 2024-08-01 PROCEDURE — 80061 LIPID PANEL: CPT

## 2024-08-01 PROCEDURE — 3008F BODY MASS INDEX DOCD: CPT | Mod: CPTII,S$GLB,, | Performed by: INTERNAL MEDICINE

## 2024-08-01 PROCEDURE — 36415 COLL VENOUS BLD VENIPUNCTURE: CPT

## 2024-08-01 PROCEDURE — 3078F DIAST BP <80 MM HG: CPT | Mod: CPTII,S$GLB,, | Performed by: INTERNAL MEDICINE

## 2024-08-01 PROCEDURE — 3044F HG A1C LEVEL LT 7.0%: CPT | Mod: CPTII,S$GLB,, | Performed by: INTERNAL MEDICINE

## 2024-08-01 PROCEDURE — 1160F RVW MEDS BY RX/DR IN RCRD: CPT | Mod: CPTII,S$GLB,, | Performed by: INTERNAL MEDICINE

## 2024-08-03 LAB
P JIROVECII DNA L RESP QL NAA+NON-PROBE: NEGATIVE
SPECIMEN SOURCE: NORMAL

## 2024-09-10 ENCOUNTER — LAB VISIT (OUTPATIENT)
Dept: LAB | Facility: HOSPITAL | Age: 29
End: 2024-09-10
Payer: COMMERCIAL

## 2024-09-10 ENCOUNTER — OFFICE VISIT (OUTPATIENT)
Dept: INFECTIOUS DISEASES | Facility: CLINIC | Age: 29
End: 2024-09-10
Payer: COMMERCIAL

## 2024-09-10 VITALS
OXYGEN SATURATION: 99 % | SYSTOLIC BLOOD PRESSURE: 120 MMHG | HEART RATE: 66 BPM | RESPIRATION RATE: 18 BRPM | WEIGHT: 226 LBS | HEIGHT: 73 IN | DIASTOLIC BLOOD PRESSURE: 76 MMHG | BODY MASS INDEX: 29.95 KG/M2

## 2024-09-10 DIAGNOSIS — B20 HIV INFECTION, UNSPECIFIED SYMPTOM STATUS: Primary | ICD-10-CM

## 2024-09-10 DIAGNOSIS — B20 HIV INFECTION, UNSPECIFIED SYMPTOM STATUS: ICD-10-CM

## 2024-09-10 DIAGNOSIS — D69.6 THROMBOCYTOPENIA: ICD-10-CM

## 2024-09-10 LAB
ABS NEUT (OLG): 0.87 X10(3)/MCL (ref 2.1–9.2)
ALBUMIN SERPL-MCNC: 4 G/DL (ref 3.5–5)
ALBUMIN/GLOB SERPL: 1.1 RATIO (ref 1.1–2)
ALP SERPL-CCNC: 60 UNIT/L (ref 40–150)
ALT SERPL-CCNC: 15 UNIT/L (ref 0–55)
ANION GAP SERPL CALC-SCNC: 5 MEQ/L
AST SERPL-CCNC: 18 UNIT/L (ref 5–34)
BASOPHILS NFR BLD MANUAL: 0.04 X10(3)/MCL (ref 0–0.2)
BASOPHILS NFR BLD MANUAL: 2 %
BILIRUB SERPL-MCNC: 0.3 MG/DL
BUN SERPL-MCNC: 12.3 MG/DL (ref 8.9–20.6)
CALCIUM SERPL-MCNC: 9.8 MG/DL (ref 8.4–10.2)
CHLORIDE SERPL-SCNC: 103 MMOL/L (ref 98–107)
CO2 SERPL-SCNC: 32 MMOL/L (ref 22–29)
CREAT SERPL-MCNC: 1.13 MG/DL (ref 0.73–1.18)
CREAT/UREA NIT SERPL: 11
EOSINOPHIL NFR BLD MANUAL: 0.23 X10(3)/MCL (ref 0–0.9)
EOSINOPHIL NFR BLD MANUAL: 11 %
ERYTHROCYTE [DISTWIDTH] IN BLOOD BY AUTOMATED COUNT: 14.9 % (ref 11.5–17)
GFR SERPLBLD CREATININE-BSD FMLA CKD-EPI: >60 ML/MIN/1.73/M2
GLOBULIN SER-MCNC: 3.5 GM/DL (ref 2.4–3.5)
GLUCOSE SERPL-MCNC: 82 MG/DL (ref 74–100)
HCT VFR BLD AUTO: 41.8 % (ref 42–52)
HGB BLD-MCNC: 12.6 G/DL (ref 14–18)
INSTRUMENT WBC (OLG): 2.13 X10(3)/MCL
LYMPHOCYTES NFR BLD MANUAL: 0.87 X10(3)/MCL
LYMPHOCYTES NFR BLD MANUAL: 41 %
MCH RBC QN AUTO: 22.9 PG (ref 27–31)
MCHC RBC AUTO-ENTMCNC: 30.1 G/DL (ref 33–36)
MCV RBC AUTO: 75.9 FL (ref 80–94)
MONOCYTES NFR BLD MANUAL: 0.11 X10(3)/MCL (ref 0.1–1.3)
MONOCYTES NFR BLD MANUAL: 5 %
NEUTROPHILS NFR BLD MANUAL: 41 %
NRBC BLD AUTO-RTO: 0 %
PLATELET # BLD AUTO: 200 X10(3)/MCL (ref 130–400)
PLATELET # BLD EST: NORMAL 10*3/UL
PMV BLD AUTO: 9.5 FL (ref 7.4–10.4)
POTASSIUM SERPL-SCNC: 4.6 MMOL/L (ref 3.5–5.1)
PROT SERPL-MCNC: 7.5 GM/DL (ref 6.4–8.3)
RBC # BLD AUTO: 5.51 X10(6)/MCL (ref 4.7–6.1)
RBC MORPH BLD: NORMAL
SODIUM SERPL-SCNC: 140 MMOL/L (ref 136–145)
WBC # BLD AUTO: 2.13 X10(3)/MCL (ref 4.5–11.5)

## 2024-09-10 PROCEDURE — 36415 COLL VENOUS BLD VENIPUNCTURE: CPT

## 2024-09-10 PROCEDURE — 90471 IMMUNIZATION ADMIN: CPT | Mod: S$GLB,,,

## 2024-09-10 PROCEDURE — 87536 HIV-1 QUANT&REVRSE TRNSCRPJ: CPT

## 2024-09-10 PROCEDURE — 3044F HG A1C LEVEL LT 7.0%: CPT | Mod: CPTII,S$GLB,,

## 2024-09-10 PROCEDURE — 3008F BODY MASS INDEX DOCD: CPT | Mod: CPTII,S$GLB,,

## 2024-09-10 PROCEDURE — 90734 MENACWYD/MENACWYCRM VACC IM: CPT | Mod: S$GLB,,,

## 2024-09-10 PROCEDURE — 86360 T CELL ABSOLUTE COUNT/RATIO: CPT

## 2024-09-10 PROCEDURE — 90472 IMMUNIZATION ADMIN EACH ADD: CPT | Mod: S$GLB,,,

## 2024-09-10 PROCEDURE — 99999 PR PBB SHADOW E&M-EST. PATIENT-LVL III: CPT | Mod: PBBFAC,,,

## 2024-09-10 PROCEDURE — 80053 COMPREHEN METABOLIC PANEL: CPT

## 2024-09-10 PROCEDURE — 99214 OFFICE O/P EST MOD 30 MIN: CPT | Mod: 25,S$GLB,,

## 2024-09-10 PROCEDURE — 85025 COMPLETE CBC W/AUTO DIFF WBC: CPT

## 2024-09-10 PROCEDURE — 3078F DIAST BP <80 MM HG: CPT | Mod: CPTII,S$GLB,,

## 2024-09-10 PROCEDURE — 85007 BL SMEAR W/DIFF WBC COUNT: CPT

## 2024-09-10 PROCEDURE — 90677 PCV20 VACCINE IM: CPT | Mod: S$GLB,,,

## 2024-09-10 PROCEDURE — 3074F SYST BP LT 130 MM HG: CPT | Mod: CPTII,S$GLB,,

## 2024-09-10 PROCEDURE — 1159F MED LIST DOCD IN RCRD: CPT | Mod: CPTII,S$GLB,,

## 2024-09-10 NOTE — PROGRESS NOTES
Subjective:       Patient ID: Khris Covington 29 y.o.     Chief Complaint:   Chief Complaint   Patient presents with    8 week f/u B20        HPI:  06/13/2024  29-year-old male patient no significant past medical history presented to his outpatient primary care on 05/23/2024 after recurrent episodes of sinusitis and an ear infection in the past year, was seen for increased pressure and pain in the head and neck as well as clear liquid drip from the nose after having teeth pulled 05/20/2024, he had been recently noted to have pancytopenia on repeat labs, HIV was ordered, found to have a positive screen is referred to us for further evaluation and management.    Was previously tested in 03/2017 that was a negative test for concerns about his previous partner. In 2019, he had a rash on the genital area and had blood work at that time showed leukopenia and got tested for HIV for concerns about different partner, his test was negative then as well.     Has been with current wife for the past 4 years. Last time they had unprotected sex was about 3-4 weeks ago, wife was tested 2 weeks ago with negative test and again last week and awaiting results.     Works [place]. Smokes a cigarette or a cigar on rare occasions, drinks alcohol occasionally, no previous IVDU, no current drug use.     Previously had multiple female partners, no male partners.     Currently feeling fatigued, generalized pains, he has pain on the R lower jaw, had L sided lower teeth pulled. Ear pain has resolved. No sore throat, no shortness of breath, no chest pain, no burning with urination.      Has a cough, with mucus production with dark-green color, he is having diarrhea started this morning, loose stools, does also report diffuse joint pains, lost about 10 lbs in the past month but no weight loss in the past 6 months, has been having night sweats for the past 2-3 weeks.      He has been taking TMP/SMX daily for the past 2 weeks.    07/29/2024  office visit:   Mr. Covington presents for follow-up today accompanied by his wife. He began taking Biktarvy on 06/24/2024. He denies any fever, chills, or night sweats. He denies any nausea or vomiting. He reports that he did have diarrhea upon beginning Biktarvy, but this has since resolved. He denies any shortness of breath. He does endorse having thick green mucous, especially in the mornings. 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 He does report that he missed two doses of Biktarvy due to running out of medication. His wife began taking PrEP through [facility].     09/10/2024 office visit:   Mr. Covington presents for follow-up today accompanied by his wife. Cough has improved. No cough currently. Deneis any fever, chills, or night sweats. Deneis nay nausea, vomtiing, or diarrhea. Continues on TMP/SMX and Biktarvy. Missed one dose of Biktarvy sicne last visit. Did have unprotected vaginal sex with his wife one month ago. She is on PrEP. Interested in the possibility of having more kids (they have one already). No new partners.     Past Medical History:   Diagnosis Date    ADHD (attention deficit hyperactivity disorder)     Anemia     HIV infection     Newly diagnosed HIV-positive 06/13/2024        Past Surgical History:   Procedure Laterality Date    WISDOM TOOTH EXTRACTION          Social History     Socioeconomic History    Marital status:    Tobacco Use    Smoking status: Former     Current packs/day: 0.25     Average packs/day: 0.3 packs/day for 2.0 years (0.5 ttl pk-yrs)     Types: Cigarettes    Smokeless tobacco: Former   Substance and Sexual Activity    Alcohol use: Yes     Alcohol/week: 2.0 standard drinks of alcohol     Types: 2 Glasses of wine per week     Comment: ocassional    Drug use: Never    Sexual activity: Yes     Partners: Female     Birth control/protection: None        Family History   Problem Relation Name Age of Onset    Kidney disease  "Maternal Grandfather Ramón Covington         Review of patient's allergies indicates:  No Known Allergies     Immunization History   Administered Date(s) Administered    COVID-19, MRNA, LN-S, PF (Pfizer) (Purple Cap) 10/01/2021, 11/04/2021    DTP 1995, 1995, 1995, 05/03/1996, 01/29/1999    DTaP 1995, 1995, 1995, 05/03/1996, 01/29/1999    HIB 1995, 1995, 1995, 05/03/1996    Hepatitis B, Pediatric/Adolescent 1995, 1995, 1995    Hib-HbOC 1995, 1995, 05/03/1996    IPV 1995, 1995, 1995, 01/29/1999    Influenza - Trivalent (PED) 10/16/2000    Influenza - Trivalent - PF (PED) 12/16/2009    Influenza A (H1N1) 2009 Monovalent - Intranasal 12/16/2009    MMR 01/18/1996, 01/29/1999    Meningococcal Conjugate (MCV4O) 2 Vial (2mo-55yr) 09/10/2024    Meningococcal Conjugate (MCV4P) 07/09/2007, 08/22/2011    OPV 1995, 1995, 1995, 01/29/1999    Pneumococcal Conjugate - 20 Valent 09/10/2024    Tdap 09/18/2006    Varicella 03/17/1997, 07/09/2007        Review of Systems   All other systems reviewed and are negative.         Objective:      /76 (BP Location: Left arm)   Pulse 66   Resp 18   Ht 6' 1" (1.854 m)   Wt 102.5 kg (226 lb)   SpO2 99%   BMI 29.82 kg/m²      Physical Exam  Constitutional:       Appearance: Normal appearance.   HENT:      Head: Normocephalic and atraumatic.      Mouth/Throat:      Pharynx: No oropharyngeal exudate or posterior oropharyngeal erythema.   Eyes:      Extraocular Movements: Extraocular movements intact.      Pupils: Pupils are equal, round, and reactive to light.   Cardiovascular:      Rate and Rhythm: Normal rate and regular rhythm.      Heart sounds: No murmur heard.  Pulmonary:      Effort: No respiratory distress.      Breath sounds: No wheezing, rhonchi or rales.   Abdominal:      General: Bowel sounds are normal. There is no distension.      Palpations: Abdomen is soft. "      Tenderness: There is no abdominal tenderness. There is no right CVA tenderness or left CVA tenderness.   Musculoskeletal:         General: No swelling or tenderness.      Cervical back: Neck supple. No rigidity or tenderness.   Lymphadenopathy:      Cervical: No cervical adenopathy.   Skin:     Findings: No lesion or rash.   Neurological:      General: No focal deficit present.      Mental Status: He is alert and oriented to person, place, and time. Mental status is at baseline.      Cranial Nerves: No cranial nerve deficit.      Motor: No weakness.   Psychiatric:         Mood and Affect: Mood normal.         Behavior: Behavior normal.          Labs: Reviewed most recent relevant labs available, notable results highlighted in this note    Imaging: Reviewed most recent relevant imaging studies available, notable results highlighted in this note    Assessment:       Problem List Items Addressed This Visit          ID    HIV infection - Primary    Relevant Orders    Pneumococcal Conjugate Vaccine (20 Valent) (IM)(Preferred) (Completed)    Comprehensive Metabolic Panel (Completed)    HIV RNA, Quantitative, PCR (Completed)    CD4 T-[place]ancock Cells (Completed)              Plan:         Diagnosed: 05/2024  Risk Factor: unprotected sex with female partner     HIV VL: 744521 on 06/13/2024, 512 on 06/24/2024; undetectable on 07/30/2024; undetectable on 9/10/2024  CD4: 98 05/2024, 65 on 06/13/2024; 183 on 7/30/2024; 224 on 9/10/2024  Shahab: 98 05/2024, 65 on 06/13/2024  ART:  Biktarvy started on 06/24/2024  OI Prophylaxis: TMP/SMX (discontinued 9/12/2024)    Screenings: Labs repeated  Hep A:  Immune on 06/13/2024  Hep B:  Nonimmune, no active infection on 06/13/2024  Hep C: Negative 06/13/2024  Treponemal Ab: Negative 06/13/2024  RPR: N/a 06/13/2024  Urine G/C: Negative 05/2024  Throat G/C:  Negative on 06/13/2024  Rectal G/C:  Negative on 06/13/2024  Quantiferon:  Negative 06/13/2024  Crypto Ag:  Negative on  06/13/2024  G6PD: N/A  HLA B*5701: N/A      Non-infectious screening:  Dexa-Scan: N/A  Lipid profile:  Total cholesterol 174,  on 07/30/2024  HbA1c: 5.3 on 07/30/2024    Vaccinations: discussed, will initiate at next visit  Flu:   PPSV23:   PCV20: 9/10/2024  TDap:   HPV:   Meningitis: 9/10/2024   COVID-19: 10/01/2021 and 11/04/2021  Hepatitis A:   Hepatitis B:  VZV:   Monkeypox:     Other:  -meningitis 2nd dose, initiation of HBV series   -doing well with pill compliance with 90 day dosing.   -interested in the possibility of kids. Discussed that since he is undetectable it is okay to try when they are ready  -CD4 223- okay to stop TMP/SMX OI    Needed at next visit:  -repeat labs, discussion above vaccination initiation of vaccination series    Counseling:   Counseled on safe sex practices and using protection at all times and disclosing status to all contacts  Counseled on importance of 100% adherence to medication and risk of resistance development      Follow up in about 6 weeks (around 10/22/2024).    Portions of this note dictated using EMR integrated voice recognition software, and may be subject to voice recognition errors not corrected at proofreading. Please contact writer for clarification if needed.    37 minutes of total time spent on the encounter, which includes face to face time and non-face to face time preparing to see the patient (eg, review of tests), Obtaining and/or reviewing separately obtained history, Documenting clinical information in the electronic or other health record, Independently interpreting results (not separately reported) and communicating results to the patient/family/caregiver, or Care coordination (not separately reported).

## 2024-09-11 LAB
CD3 CELLS # BLD: 534 CELLS/MCL (ref 550–2202)
CD3 CELLS NFR BLD: 72 % (ref 58–86)
CD3+CD4+ CELLS # BLD: 224 CELLS/MCL (ref 365–1437)
CD3+CD4+ CELLS NFR BLD: 30 % (ref 32–64)
CD3+CD4+ CELLS/CD3+CD8+ CLL BLD: 0.8 %
CD3+CD8+ CELLS # BLD: 293 CELLS/MCL (ref 199–846)
CD3+CD8+ CELLS NFR BLD: 40 % (ref 18–40)
CD45 CELLS # BLD: 0.74 THOU/MCL (ref 0.82–2.84)

## 2024-09-12 ENCOUNTER — TELEPHONE (OUTPATIENT)
Dept: INFECTIOUS DISEASES | Facility: CLINIC | Age: 29
End: 2024-09-12
Payer: COMMERCIAL

## 2024-09-12 LAB — HIV1 RNA SERPL NAA+PROBE-LOG#: ABNORMAL {LOG_COPIES}/ML

## 2024-09-12 NOTE — PROGRESS NOTES
Please let him know that his CD4 count is above 200, now at 224. He can discontinue Bactrim at this time. Thanks

## 2024-10-01 ENCOUNTER — TELEPHONE (OUTPATIENT)
Dept: INFECTIOUS DISEASES | Facility: CLINIC | Age: 29
End: 2024-10-01
Payer: COMMERCIAL

## 2024-10-01 NOTE — TELEPHONE ENCOUNTER
RECEIVED COMMUNICATION FROM [facility] THAT PT WAS NON-ADHERENT TO ANTIRETROVIRAL MEDS.. CALLED TO INQUIRE IF PT IS STEADILY TAKING MEDS. NO ANSWER. LEFT VM FOR PT TO CALL ME BACK. SLSL

## 2024-10-14 ENCOUNTER — OFFICE VISIT (OUTPATIENT)
Dept: INFECTIOUS DISEASES | Facility: CLINIC | Age: 29
End: 2024-10-14
Payer: COMMERCIAL

## 2024-10-14 ENCOUNTER — LAB VISIT (OUTPATIENT)
Dept: LAB | Facility: HOSPITAL | Age: 29
End: 2024-10-14
Attending: GENERAL PRACTICE
Payer: COMMERCIAL

## 2024-10-14 VITALS
DIASTOLIC BLOOD PRESSURE: 77 MMHG | HEIGHT: 73 IN | WEIGHT: 236 LBS | HEART RATE: 68 BPM | RESPIRATION RATE: 18 BRPM | TEMPERATURE: 99 F | OXYGEN SATURATION: 99 % | BODY MASS INDEX: 31.28 KG/M2 | SYSTOLIC BLOOD PRESSURE: 130 MMHG

## 2024-10-14 DIAGNOSIS — R21 RASH: ICD-10-CM

## 2024-10-14 DIAGNOSIS — Z21 HIV INFECTION, UNSPECIFIED SYMPTOM STATUS: Primary | ICD-10-CM

## 2024-10-14 DIAGNOSIS — Z21 HIV INFECTION, UNSPECIFIED SYMPTOM STATUS: ICD-10-CM

## 2024-10-14 DIAGNOSIS — Z21 NEWLY DIAGNOSED HIV-POSITIVE: ICD-10-CM

## 2024-10-14 LAB
ALBUMIN SERPL-MCNC: 3.8 G/DL (ref 3.5–5)
ALBUMIN/GLOB SERPL: 1 RATIO (ref 1.1–2)
ALP SERPL-CCNC: 70 UNIT/L (ref 40–150)
ALT SERPL-CCNC: 13 UNIT/L (ref 0–55)
ANION GAP SERPL CALC-SCNC: 5 MEQ/L
AST SERPL-CCNC: 18 UNIT/L (ref 5–34)
BASOPHILS # BLD AUTO: 0.01 X10(3)/MCL
BASOPHILS NFR BLD AUTO: 0.3 %
BILIRUB SERPL-MCNC: 0.1 MG/DL
BUN SERPL-MCNC: 15.6 MG/DL (ref 8.9–20.6)
CALCIUM SERPL-MCNC: 9.3 MG/DL (ref 8.4–10.2)
CHLORIDE SERPL-SCNC: 107 MMOL/L (ref 98–107)
CO2 SERPL-SCNC: 27 MMOL/L (ref 22–29)
CREAT SERPL-MCNC: 1.15 MG/DL (ref 0.73–1.18)
CREAT/UREA NIT SERPL: 14
EOSINOPHIL # BLD AUTO: 0.21 X10(3)/MCL (ref 0–0.9)
EOSINOPHIL NFR BLD AUTO: 6.4 %
ERYTHROCYTE [DISTWIDTH] IN BLOOD BY AUTOMATED COUNT: 13.9 % (ref 11.5–17)
GFR SERPLBLD CREATININE-BSD FMLA CKD-EPI: >60 ML/MIN/1.73/M2
GLOBULIN SER-MCNC: 3.9 GM/DL (ref 2.4–3.5)
GLUCOSE SERPL-MCNC: 101 MG/DL (ref 74–100)
HCT VFR BLD AUTO: 39.1 % (ref 42–52)
HGB BLD-MCNC: 11.8 G/DL (ref 14–18)
IMM GRANULOCYTES # BLD AUTO: 0 X10(3)/MCL (ref 0–0.04)
IMM GRANULOCYTES NFR BLD AUTO: 0 %
LYMPHOCYTES # BLD AUTO: 0.87 X10(3)/MCL (ref 0.6–4.6)
LYMPHOCYTES NFR BLD AUTO: 26.4 %
MCH RBC QN AUTO: 22.9 PG (ref 27–31)
MCHC RBC AUTO-ENTMCNC: 30.2 G/DL (ref 33–36)
MCV RBC AUTO: 75.9 FL (ref 80–94)
MONOCYTES # BLD AUTO: 0.3 X10(3)/MCL (ref 0.1–1.3)
MONOCYTES NFR BLD AUTO: 9.1 %
NEUTROPHILS # BLD AUTO: 1.9 X10(3)/MCL (ref 2.1–9.2)
NEUTROPHILS NFR BLD AUTO: 57.8 %
NRBC BLD AUTO-RTO: 0 %
PLATELET # BLD AUTO: 190 X10(3)/MCL (ref 130–400)
PMV BLD AUTO: 10.2 FL (ref 7.4–10.4)
POTASSIUM SERPL-SCNC: 3.8 MMOL/L (ref 3.5–5.1)
PROT SERPL-MCNC: 7.7 GM/DL (ref 6.4–8.3)
RBC # BLD AUTO: 5.15 X10(6)/MCL (ref 4.7–6.1)
SODIUM SERPL-SCNC: 139 MMOL/L (ref 136–145)
WBC # BLD AUTO: 3.29 X10(3)/MCL (ref 4.5–11.5)

## 2024-10-14 PROCEDURE — 90715 TDAP VACCINE 7 YRS/> IM: CPT | Mod: S$GLB,,, | Performed by: GENERAL PRACTICE

## 2024-10-14 PROCEDURE — 3075F SYST BP GE 130 - 139MM HG: CPT | Mod: CPTII,S$GLB,, | Performed by: GENERAL PRACTICE

## 2024-10-14 PROCEDURE — 99999 PR PBB SHADOW E&M-EST. PATIENT-LVL III: CPT | Mod: PBBFAC,,, | Performed by: GENERAL PRACTICE

## 2024-10-14 PROCEDURE — 90472 IMMUNIZATION ADMIN EACH ADD: CPT | Mod: S$GLB,,, | Performed by: GENERAL PRACTICE

## 2024-10-14 PROCEDURE — 99214 OFFICE O/P EST MOD 30 MIN: CPT | Mod: 25,S$GLB,, | Performed by: GENERAL PRACTICE

## 2024-10-14 PROCEDURE — 90471 IMMUNIZATION ADMIN: CPT | Mod: S$GLB,,, | Performed by: GENERAL PRACTICE

## 2024-10-14 PROCEDURE — 3008F BODY MASS INDEX DOCD: CPT | Mod: CPTII,S$GLB,, | Performed by: GENERAL PRACTICE

## 2024-10-14 PROCEDURE — 3044F HG A1C LEVEL LT 7.0%: CPT | Mod: CPTII,S$GLB,, | Performed by: GENERAL PRACTICE

## 2024-10-14 PROCEDURE — 80053 COMPREHEN METABOLIC PANEL: CPT

## 2024-10-14 PROCEDURE — 36415 COLL VENOUS BLD VENIPUNCTURE: CPT

## 2024-10-14 PROCEDURE — 3078F DIAST BP <80 MM HG: CPT | Mod: CPTII,S$GLB,, | Performed by: GENERAL PRACTICE

## 2024-10-14 PROCEDURE — 85025 COMPLETE CBC W/AUTO DIFF WBC: CPT

## 2024-10-14 PROCEDURE — 87536 HIV-1 QUANT&REVRSE TRNSCRPJ: CPT

## 2024-10-14 PROCEDURE — 90739 HEPB VACC 2/4 DOSE ADULT IM: CPT | Mod: S$GLB,,, | Performed by: GENERAL PRACTICE

## 2024-10-14 NOTE — PROGRESS NOTES
Subjective     Patient ID: Khris Covington is a 29 y.o. male.    Chief Complaint: 1 month follow up  (B20)    HPI  Review of Systems       Objective     Physical Exam       Assessment and Plan     1. HIV infection, unspecified symptom status  -     CBC Auto Differential; Future; Expected date: 10/14/2024  -     Comprehensive Metabolic Panel; Future; Expected date: 10/14/2024  -     HIV RNA, Quantitative, PCR; Future; Expected date: 10/14/2024    2. Rash  -     CBC Auto Differential; Future; Expected date: 10/14/2024  -     Comprehensive Metabolic Panel; Future; Expected date: 10/14/2024  -     HIV RNA, Quantitative, PCR; Future; Expected date: 10/14/2024    3. Newly diagnosed HIV-positive  -     imeyuunss-fxavgjeb-oupqsbt ala (BIKTARVY) -25 mg (25 kg or greater); Take 1 tablet by mouth once daily.  Dispense: 90 tablet; Refill: 1        Cris cooper lpn         Follow up in about 3 months (around 1/14/2025).

## 2024-10-14 NOTE — PROGRESS NOTES
Subjective:       Patient ID: Khris Covington 29 y.o.     Chief Complaint:   Chief Complaint   Patient presents with    1 month follow up      B20        HPI:  06/13/2024  29-year-old male patient no significant past medical history presented to his outpatient primary care on 05/23/2024 after recurrent episodes of sinusitis and an ear infection in the past year, was seen for increased pressure and pain in the head and neck as well as clear liquid drip from the nose after having teeth pulled 05/20/2024, he had been recently noted to have pancytopenia on repeat labs, HIV was ordered, found to have a positive screen is referred to us for further evaluation and management.  Was previously tested in 03/2017 that was a negative test for concerns about his previous partner. In 2019, he had a rash on the genital area and had blood work at that time showed leukopenia and got tested for HIV for concerns about different partner, his test was negative then as well.   Has been with current wife for the past 4 years. Last time they had unprotected sex was about 3-4 weeks ago, wife was tested 2 weeks ago with negative test and again last week and awaiting results.   Works offshore. Smokes a cigarette or a cigar on rare occasions, drinks alcohol occasionally, no previous IVDU, no current drug use.   Previously had multiple female partners, no male partners.   Currently feeling fatigued, generalized pains, he has pain on the R lower jaw, had L sided lower teeth pulled. Ear pain has resolved. No sore throat, no shortness of breath, no chest pain, no burning with urination.    Has a cough, with mucus production with dark-green color, he is having diarrhea started this morning, loose stools, does also report diffuse joint pains, lost about 10 lbs in the past month but no weight loss in the past 6 months, has been having night sweats for the past 2-3 weeks.    He has been taking TMP/SMX daily for the past 2 weeks.    07/29/2024 office  visit:   Mr. Covington presents for follow-up today accompanied by his wife. He began taking Biktarvy on 06/24/2024. He denies any fever, chills, or night sweats. He denies any nausea or vomiting. He reports that he did have diarrhea upon beginning Biktarvy, but this has since resolved. He denies any shortness of breath. He does endorse having thick green mucous, especially in the mornings. 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 He does report that he missed two doses of Biktarvy due to running out of medication. His wife began taking PrEP through [facility].     09/10/2024 office visit:   Mr. Covington presents for follow-up today accompanied by his wife. Cough has improved. No cough currently. Deneis any fever, chills, or night sweats. Deneis nay nausea, vomtiing, or diarrhea. Continues on TMP/SMX and Biktarvy. Missed one dose of Biktarvy sicne last visit. Did have unprotected vaginal sex with his wife one month ago. She is on PrEP. Interested in the possibility of having more kids (they have one already). No new partners.     10/14/2024:  Reports only 2 missed doses in the past 3-4 months. Otherwise taking the medication every day. He is on Biktarvy. He has no rashes, no headaches, no urinary or bowel issues.     He is a little fatigued however he reports decrease in level of energy.  He also reports having a rash in his genital area.  Believes this is likely a reaction to lotion used.        Past Medical History:   Diagnosis Date    ADHD (attention deficit hyperactivity disorder)     Anemia     HIV infection     Newly diagnosed HIV-positive 06/13/2024        Past Surgical History:   Procedure Laterality Date    WISDOM TOOTH EXTRACTION          Social History     Socioeconomic History    Marital status:    Tobacco Use    Smoking status: Former     Current packs/day: 0.25     Average packs/day: 0.3 packs/day for 2.0 years (0.5 ttl pk-yrs)     Types: Cigarettes  "   Smokeless tobacco: Former   Substance and Sexual Activity    Alcohol use: Yes     Alcohol/week: 2.0 standard drinks of alcohol     Types: 2 Glasses of wine per week     Comment: ocassional    Drug use: Never    Sexual activity: Yes     Partners: Female     Birth control/protection: None        Family History   Problem Relation Name Age of Onset    Kidney disease Maternal Grandfather Ramón Covington         Review of patient's allergies indicates:  No Known Allergies     Immunization History   Administered Date(s) Administered    COVID-19, MRNA, LN-S, PF (Pfizer) (Purple Cap) 10/01/2021, 11/04/2021    DTP 1995, 1995, 1995, 05/03/1996, 01/29/1999    DTaP 1995, 1995, 1995, 05/03/1996, 01/29/1999    HIB 1995, 1995, 1995, 05/03/1996    Hepatitis B, Pediatric/Adolescent 1995, 1995, 1995    Hib-HbOC 1995, 1995, 05/03/1996    IPV 1995, 1995, 1995, 01/29/1999    Influenza - Trivalent (PED) 10/16/2000    Influenza - Trivalent - PF (PED) 12/16/2009    Influenza A (H1N1) 2009 Monovalent - Intranasal 12/16/2009    MMR 01/18/1996, 01/29/1999    Meningococcal Conjugate (MCV4O) 2 Vial (2mo-55yr) 09/10/2024    Meningococcal Conjugate (MCV4P) 07/09/2007, 08/22/2011    OPV 1995, 1995, 1995, 01/29/1999    Pneumococcal Conjugate - 20 Valent 09/10/2024    Tdap 09/18/2006    Varicella 03/17/1997, 07/09/2007        Review of Systems   All other systems reviewed and are negative.         Objective:      /77   Pulse 68   Temp 98.6 °F (37 °C) (Oral)   Resp 18   Ht 6' 1" (1.854 m)   Wt 107 kg (236 lb)   SpO2 99%   BMI 31.14 kg/m²      Physical Exam  Constitutional:       Appearance: Normal appearance.   HENT:      Head: Normocephalic and atraumatic.      Mouth/Throat:      Pharynx: No oropharyngeal exudate or posterior oropharyngeal erythema.   Eyes:      Extraocular Movements: Extraocular movements intact.      " Pupils: Pupils are equal, round, and reactive to light.   Cardiovascular:      Rate and Rhythm: Normal rate and regular rhythm.      Heart sounds: No murmur heard.  Pulmonary:      Effort: No respiratory distress.      Breath sounds: No wheezing, rhonchi or rales.   Abdominal:      General: Bowel sounds are normal. There is no distension.      Palpations: Abdomen is soft.      Tenderness: There is no abdominal tenderness. There is no right CVA tenderness or left CVA tenderness.   Genitourinary:     Comments: Some discoloration on the shaft of the penis and on the scrotum, evidence erythema, irritation, tenderness, no lesions, no active inflammation noted  Musculoskeletal:         General: No swelling or tenderness.      Cervical back: Neck supple. No rigidity or tenderness.   Lymphadenopathy:      Cervical: No cervical adenopathy.   Skin:     Findings: No lesion or rash.   Neurological:      General: No focal deficit present.      Mental Status: He is alert and oriented to person, place, and time. Mental status is at baseline.      Cranial Nerves: No cranial nerve deficit.      Motor: No weakness.   Psychiatric:         Mood and Affect: Mood normal.         Behavior: Behavior normal.          Labs: Reviewed most recent relevant labs available, notable results highlighted in this note    Imaging: Reviewed most recent relevant imaging studies available, notable results highlighted in this note    Assessment:       Problem List Items Addressed This Visit          Derm    Rash    Relevant Orders    CBC Auto Differential    Comprehensive Metabolic Panel    HIV RNA, Quantitative, PCR       ID    HIV infection - Primary    Relevant Orders    CBC Auto Differential    Comprehensive Metabolic Panel    HIV RNA, Quantitative, PCR              Plan:         Diagnosed: 05/2024  Risk Factor: unprotected sex with female partner     HIV VL: 135532 on 06/13/2024, 512 on 06/24/2024; undetectable on 07/30/2024; undetectable on  9/10/2024  CD4: 98 2024, 65 on 2024; 183 on 2024; 224 on 9/10/2024  Shahab: 98 2024, 65 on 2024  ART:  Biktarvy started on 2024  OI Prophylaxis: TMP/SMX (discontinued 2024)    Screenings: Labs repeated  Hep A:  Immune on 2024  Hep B:  Nonimmune, no active infection on 2024  Hep C: Negative 2024  Treponemal Ab: Negative 2024  RPR: N/a 2024  Urine G/C: Negative 2024  Throat G/C:  Negative on 2024  Rectal G/C:  Negative on 2024  Quantiferon:  Negative 2024  Crypto Ag:  Negative on 2024  G6PD: N/A  HLA B*5701: N/A      Non-infectious screening:  Dexa-Scan: N/A  Lipid profile:  Total cholesterol 174,  on 2024  HbA1c: 5.3 on 2024    Vaccinations:   Flu:   PCV20: 9/10/2024  TDap:   HPV:   Meningitis: 9/10/2024   COVID-19: 10/01/2021 and 2021  Hepatitis A: immune by serology  Hepatitis B:  VZV: done   Monkeypox: N/A    Other:  -100% adherence to medication, very well-controlled HIV viral load   -discoloration on the penis and the scrotum likely due to allergic reaction from lotion, no evidence of fungal infection active, patient reports that this happened exactly the same as the previous time using the same lotion, currently appears to be resolving   -will get HBV vaccine dose 1 today, we will also get Tdap   -recommending obtaining a flu vaccine at pharmacy, unfortunately unavailable at my office today   -discussed with the patient and wife today conception while on ART.  Discussed that the risk of transmission or  HIV in this case decreases dramatically and somewhat remains negligible with rates less than 1%, discussed the options available of sperm washing and IVF although this would be a very expensive venture, not available readily, patient and wife are aware of this and not interested in pursuing it at this time, patient's wife is on PrEP  -due to some fatigue, a repeat CBC, CMP and viral  load but no need for additional testing as the patient overall is feeling quite well    Needed at next visit:  -repeat labs, discussion above vaccination initiation of vaccination series      Follow up in about 3 months (around 1/14/2025).    Portions of this note dictated using EMR integrated voice recognition software, and may be subject to voice recognition errors not corrected at proofreading. Please contact writer for clarification if needed.    35 minutes of total time spent on the encounter, which includes face to face time and non-face to face time preparing to see the patient (eg, review of tests), Obtaining and/or reviewing separately obtained history, Documenting clinical information in the electronic or other health record, Independently interpreting results (not separately reported) and communicating results to the patient/family/caregiver, or Care coordination (not separately reported).

## 2024-10-15 LAB — HIV1 RNA SERPL NAA+PROBE-LOG#: NOT DETECTED {LOG_COPIES}/ML

## 2024-12-18 DIAGNOSIS — Z21 HIV INFECTION, UNSPECIFIED SYMPTOM STATUS: Primary | ICD-10-CM

## 2024-12-23 ENCOUNTER — LAB VISIT (OUTPATIENT)
Dept: LAB | Facility: HOSPITAL | Age: 29
End: 2024-12-23
Payer: COMMERCIAL

## 2024-12-23 DIAGNOSIS — Z21 HIV INFECTION, UNSPECIFIED SYMPTOM STATUS: ICD-10-CM

## 2024-12-23 LAB
ALBUMIN SERPL-MCNC: 4.3 G/DL (ref 3.5–5)
ALBUMIN/GLOB SERPL: 1.3 RATIO (ref 1.1–2)
ALP SERPL-CCNC: 67 UNIT/L (ref 40–150)
ALT SERPL-CCNC: 16 UNIT/L (ref 0–55)
ANION GAP SERPL CALC-SCNC: 8 MEQ/L
AST SERPL-CCNC: 20 UNIT/L (ref 5–34)
BASOPHILS # BLD AUTO: 0.02 X10(3)/MCL
BASOPHILS NFR BLD AUTO: 0.7 %
BILIRUB SERPL-MCNC: 0.4 MG/DL
BUN SERPL-MCNC: 15.2 MG/DL (ref 8.9–20.6)
CALCIUM SERPL-MCNC: 9.9 MG/DL (ref 8.4–10.2)
CHLORIDE SERPL-SCNC: 103 MMOL/L (ref 98–107)
CO2 SERPL-SCNC: 27 MMOL/L (ref 22–29)
CREAT SERPL-MCNC: 1.03 MG/DL (ref 0.72–1.25)
CREAT/UREA NIT SERPL: 15
EOSINOPHIL # BLD AUTO: 0.13 X10(3)/MCL (ref 0–0.9)
EOSINOPHIL NFR BLD AUTO: 4.3 %
ERYTHROCYTE [DISTWIDTH] IN BLOOD BY AUTOMATED COUNT: 14.3 % (ref 11.5–17)
GFR SERPLBLD CREATININE-BSD FMLA CKD-EPI: >60 ML/MIN/1.73/M2
GLOBULIN SER-MCNC: 3.4 GM/DL (ref 2.4–3.5)
GLUCOSE SERPL-MCNC: 87 MG/DL (ref 74–100)
HCT VFR BLD AUTO: 40.7 % (ref 42–52)
HGB BLD-MCNC: 12.3 G/DL (ref 14–18)
IMM GRANULOCYTES # BLD AUTO: 0 X10(3)/MCL (ref 0–0.04)
IMM GRANULOCYTES NFR BLD AUTO: 0 %
LYMPHOCYTES # BLD AUTO: 0.89 X10(3)/MCL (ref 0.6–4.6)
LYMPHOCYTES NFR BLD AUTO: 29.7 %
MCH RBC QN AUTO: 22.9 PG (ref 27–31)
MCHC RBC AUTO-ENTMCNC: 30.2 G/DL (ref 33–36)
MCV RBC AUTO: 75.8 FL (ref 80–94)
MONOCYTES # BLD AUTO: 0.28 X10(3)/MCL (ref 0.1–1.3)
MONOCYTES NFR BLD AUTO: 9.3 %
NEUTROPHILS # BLD AUTO: 1.68 X10(3)/MCL (ref 2.1–9.2)
NEUTROPHILS NFR BLD AUTO: 56 %
NRBC BLD AUTO-RTO: 0 %
PLATELET # BLD AUTO: 204 X10(3)/MCL (ref 130–400)
PMV BLD AUTO: 9.7 FL (ref 7.4–10.4)
POTASSIUM SERPL-SCNC: 4.2 MMOL/L (ref 3.5–5.1)
PROT SERPL-MCNC: 7.7 GM/DL (ref 6.4–8.3)
RBC # BLD AUTO: 5.37 X10(6)/MCL (ref 4.7–6.1)
SODIUM SERPL-SCNC: 138 MMOL/L (ref 136–145)
WBC # BLD AUTO: 3 X10(3)/MCL (ref 4.5–11.5)

## 2024-12-23 PROCEDURE — 80053 COMPREHEN METABOLIC PANEL: CPT

## 2024-12-23 PROCEDURE — 86359 T CELLS TOTAL COUNT: CPT

## 2024-12-23 PROCEDURE — 85025 COMPLETE CBC W/AUTO DIFF WBC: CPT

## 2024-12-23 PROCEDURE — 87536 HIV-1 QUANT&REVRSE TRNSCRPJ: CPT

## 2024-12-23 PROCEDURE — 36415 COLL VENOUS BLD VENIPUNCTURE: CPT

## 2024-12-24 LAB
CD3 CELLS # BLD: 456 CELLS/MCL (ref 550–2202)
CD3 CELLS NFR BLD: 68 % (ref 58–86)
CD3+CD4+ CELLS # BLD: 210 CELLS/MCL (ref 365–1437)
CD3+CD4+ CELLS NFR BLD: 31 % (ref 32–64)
CD3+CD4+ CELLS/CD3+CD8+ CLL BLD: 0.9 %
CD3+CD8+ CELLS # BLD: 236 CELLS/MCL (ref 199–846)
CD3+CD8+ CELLS NFR BLD: 35 % (ref 18–40)
CD45 CELLS # BLD: 0.67 THOU/MCL (ref 0.82–2.84)

## 2024-12-26 LAB — HIV1 RNA SERPL NAA+PROBE-LOG#: NOT DETECTED {LOG_COPIES}/ML

## 2025-02-10 ENCOUNTER — OFFICE VISIT (OUTPATIENT)
Dept: INFECTIOUS DISEASES | Facility: CLINIC | Age: 30
End: 2025-02-10
Payer: COMMERCIAL

## 2025-02-10 VITALS
DIASTOLIC BLOOD PRESSURE: 80 MMHG | HEIGHT: 73 IN | TEMPERATURE: 98 F | RESPIRATION RATE: 18 BRPM | BODY MASS INDEX: 31.26 KG/M2 | SYSTOLIC BLOOD PRESSURE: 146 MMHG | HEART RATE: 77 BPM | WEIGHT: 235.88 LBS | OXYGEN SATURATION: 97 %

## 2025-02-10 DIAGNOSIS — Z21 HIV INFECTION, UNSPECIFIED SYMPTOM STATUS: Primary | ICD-10-CM

## 2025-02-10 DIAGNOSIS — Z11.3 ROUTINE SCREENING FOR STI (SEXUALLY TRANSMITTED INFECTION): ICD-10-CM

## 2025-02-10 PROCEDURE — 99214 OFFICE O/P EST MOD 30 MIN: CPT | Mod: 25,S$GLB,,

## 2025-02-10 PROCEDURE — 3079F DIAST BP 80-89 MM HG: CPT | Mod: CPTII,S$GLB,,

## 2025-02-10 PROCEDURE — 3077F SYST BP >= 140 MM HG: CPT | Mod: CPTII,S$GLB,,

## 2025-02-10 PROCEDURE — 90739 HEPB VACC 2/4 DOSE ADULT IM: CPT | Mod: S$GLB,,,

## 2025-02-10 PROCEDURE — 3008F BODY MASS INDEX DOCD: CPT | Mod: CPTII,S$GLB,,

## 2025-02-10 PROCEDURE — 90734 MENACWYD/MENACWYCRM VACC IM: CPT | Mod: S$GLB,,,

## 2025-02-10 PROCEDURE — 99999 PR PBB SHADOW E&M-EST. PATIENT-LVL III: CPT | Mod: PBBFAC,,,

## 2025-02-10 PROCEDURE — 90472 IMMUNIZATION ADMIN EACH ADD: CPT | Mod: S$GLB,,,

## 2025-02-10 PROCEDURE — 90471 IMMUNIZATION ADMIN: CPT | Mod: S$GLB,,,

## 2025-02-10 NOTE — PROGRESS NOTES
Subjective:       Patient ID: Khris Covington 30 y.o.     Chief Complaint:   Chief Complaint   Patient presents with    3 month follow up      B20 Patient complains of congestion, body aches, coughing. No fever        HPI:  06/13/2024  29-year-old male patient no significant past medical history presented to his outpatient primary care on 05/23/2024 after recurrent episodes of sinusitis and an ear infection in the past year, was seen for increased pressure and pain in the head and neck as well as clear liquid drip from the nose after having teeth pulled 05/20/2024, he had been recently noted to have pancytopenia on repeat labs, HIV was ordered, found to have a positive screen is referred to us for further evaluation and management.  Was previously tested in 03/2017 that was a negative test for concerns about his previous partner. In 2019, he had a rash on the genital area and had blood work at that time showed leukopenia and got tested for HIV for concerns about different partner, his test was negative then as well.   Has been with current wife for the past 4 years. Last time they had unprotected sex was about 3-4 weeks ago, wife was tested 2 weeks ago with negative test and again last week and awaiting results.   Works offshore. Smokes a cigarette or a cigar on rare occasions, drinks alcohol occasionally, no previous IVDU, no current drug use.   Previously had multiple female partners, no male partners.   Currently feeling fatigued, generalized pains, he has pain on the R lower jaw, had L sided lower teeth pulled. Ear pain has resolved. No sore throat, no shortness of breath, no chest pain, no burning with urination.    Has a cough, with mucus production with dark-green color, he is having diarrhea started this morning, loose stools, does also report diffuse joint pains, lost about 10 lbs in the past month but no weight loss in the past 6 months, has been having night sweats for the past 2-3 weeks.    He has been  taking TMP/SMX daily for the past 2 weeks.    07/29/2024 office visit:   Mr. Covington presents for follow-up today accompanied by his wife. He began taking Biktarvy on 06/24/2024. He denies any fever, chills, or night sweats. He denies any nausea or vomiting. He reports that he did have diarrhea upon beginning Biktarvy, but this has since resolved. He denies any shortness of breath. He does endorse having thick green mucous, especially in the mornings. 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 He does report that he missed two doses of Biktarvy due to running out of medication. His wife began taking PrEP through [facility].     09/10/2024 office visit:   Mr. Covington presents for follow-up today accompanied by his wife. Cough has improved. No cough currently. Deneis any fever, chills, or night sweats. Deneis nay nausea, vomtiing, or diarrhea. Continues on TMP/SMX and Biktarvy. Missed one dose of Biktarvy sicne last visit. Did have unprotected vaginal sex with his wife one month ago. She is on PrEP. Interested in the possibility of having more kids (they have one already). No new partners.     10/14/2024:  Reports only 2 missed doses in the past 3-4 months. Otherwise taking the medication every day. He is on Biktarvy. He has no rashes, no headaches, no urinary or bowel issues.     He is a little fatigued however he reports decrease in level of energy.  He also reports having a rash in his genital area.  Believes this is likely a reaction to lotion used.      2/10/2025:   Reports for follow-up accompanied by his wife and daughter.  Denies any fever, chills, shortness of breath.  Denies any nausea, vomiting, or diarrhea.  Does report that since last week he has had generalized malaise with heavy mucus in a productive cough with green/red mucus.  He denies any fever chills or night sweats.  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  Of  note, wife is currently pregnant.  She is on prep.      Past Medical History:   Diagnosis Date    ADHD (attention deficit hyperactivity disorder)     Anemia     HIV infection     Newly diagnosed HIV-positive 06/13/2024        Past Surgical History:   Procedure Laterality Date    WISDOM TOOTH EXTRACTION          Social History     Socioeconomic History    Marital status:    Tobacco Use    Smoking status: Former     Current packs/day: 0.25     Average packs/day: 0.3 packs/day for 2.0 years (0.5 ttl pk-yrs)     Types: Cigarettes    Smokeless tobacco: Former   Substance and Sexual Activity    Alcohol use: Yes     Alcohol/week: 2.0 standard drinks of alcohol     Types: 2 Glasses of wine per week     Comment: ocassional    Drug use: Never    Sexual activity: Yes     Partners: Female     Birth control/protection: None        Family History   Problem Relation Name Age of Onset    Kidney disease Maternal Grandfather Ramón Covington         Review of patient's allergies indicates:  No Known Allergies     Immunization History   Administered Date(s) Administered    COVID-19, MRNA, LN-S, PF (Pfizer) (Purple Cap) 10/01/2021, 11/04/2021    DTP 1995, 1995, 1995, 05/03/1996, 01/29/1999    DTaP 1995, 1995, 1995, 05/03/1996, 01/29/1999    HIB 1995, 1995, 1995, 05/03/1996    Hepatitis B (recombinant) Adjuvanted, 2 dose 10/14/2024    Hepatitis B, Pediatric/Adolescent 1995, 1995, 1995    Hib-HbOC 1995, 1995, 05/03/1996    IPV 1995, 1995, 1995, 01/29/1999    Influenza - Trivalent (PED) 10/16/2000    Influenza - Trivalent - PF (PED) 12/16/2009    Influenza A (H1N1) 2009 Monovalent - Intranasal 12/16/2009    MMR 01/18/1996, 01/29/1999    Meningococcal Conjugate (MCV4O) 2 Vial (2mo-55yr) 09/10/2024    Meningococcal Conjugate (MCV4P) 07/09/2007, 08/22/2011    OPV 1995, 1995, 1995, 01/29/1999    Pneumococcal Conjugate - 20  "Valent 09/10/2024    Tdap 09/18/2006, 10/14/2024    Varicella 03/17/1997, 07/09/2007        Review of Systems   All other systems reviewed and are negative.         Objective:      BP (!) 146/80 (BP Location: Right arm, Patient Position: Sitting)   Pulse 77   Temp 98.4 °F (36.9 °C) (Oral)   Resp 18   Ht 6' 1" (1.854 m)   Wt 107 kg (235 lb 14.3 oz)   SpO2 97%   BMI 31.12 kg/m²      Physical Exam  Constitutional:       Appearance: Normal appearance.   HENT:      Head: Normocephalic and atraumatic.      Mouth/Throat:      Pharynx: No oropharyngeal exudate or posterior oropharyngeal erythema.   Eyes:      Extraocular Movements: Extraocular movements intact.      Pupils: Pupils are equal, round, and reactive to light.   Cardiovascular:      Rate and Rhythm: Normal rate and regular rhythm.      Heart sounds: No murmur heard.  Pulmonary:      Effort: No respiratory distress.      Breath sounds: No wheezing, rhonchi or rales.   Abdominal:      General: Bowel sounds are normal. There is no distension.      Palpations: Abdomen is soft.      Tenderness: There is no abdominal tenderness. There is no right CVA tenderness or left CVA tenderness.   Musculoskeletal:         General: No swelling or tenderness.      Cervical back: Neck supple. No rigidity or tenderness.   Lymphadenopathy:      Cervical: No cervical adenopathy.   Skin:     Findings: No lesion or rash.   Neurological:      General: No focal deficit present.      Mental Status: He is alert and oriented to person, place, and time. Mental status is at baseline.      Cranial Nerves: No cranial nerve deficit.      Motor: No weakness.   Psychiatric:         Mood and Affect: Mood normal.         Behavior: Behavior normal.          Labs: Reviewed most recent relevant labs available, notable results highlighted in this note    Imaging: Reviewed most recent relevant imaging studies available, notable results highlighted in this note    Assessment:       Problem List Items " Addressed This Visit    None               Plan:         Diagnosed: 05/2024  Risk Factor: unprotected sex with female partner     HIV VL: 902394 on 06/13/2024, 512 on 06/24/2024; undetectable on 07/30/2024; undetectable on 9/10/2024, undetectable on 12/23/2024  CD4: 98 05/2024, 65 on 06/13/2024; 183 on 7/30/2024; 224 on 9/10/2024; 210 on 12/23/2024  Shahab: 98 05/2024, 65 on 06/13/2024  ART:  Biktarvy started on 06/24/2024  OI Prophylaxis: TMP/SMX (discontinued 9/12/2024)    Screenings: Labs repeated  Hep A:  Immune on 06/13/2024  Hep B:  Nonimmune, no active infection on 06/13/2024  Hep C: Negative 06/13/2024  Treponemal Ab: Negative 06/13/2024  RPR: N/a 06/13/2024  Urine G/C: Negative 05/2024  Throat G/C:  Negative on 06/13/2024  Rectal G/C:  Negative on 06/13/2024  Quantiferon:  Negative 06/13/2024  Crypto Ag:  Negative on 06/13/2024  G6PD: N/A  HLA B*5701: N/A      Non-infectious screening:  Dexa-Scan: N/A  Lipid profile:  Total cholesterol 174,  on 07/30/2024  HbA1c: 5.3 on 07/30/2024    Vaccinations:   Flu:  Recommended to obtain at local pharmacy  PCV20: 9/10/2024  TDap:  10/14/2024  HPV:   Meningitis: 9/10/2024 , 02/10/2025  COVID-19: 10/01/2021 and 11/04/2021  Hepatitis A: immune by serology  Hepatitis B:  10/14/2024, 02/10/2025  VZV: done 1997  Monkeypox: N/A    HBV, Meningitis dose 2     Other:  -HPV vaccination series initiation    Needed at next visit:  -repeat labs, discussion above vaccination initiation of vaccination series      No follow-ups on file.    Portions of this note dictated using EMR integrated voice recognition software, and may be subject to voice recognition errors not corrected at proofreading. Please contact writer for clarification if needed.    35 minutes of total time spent on the encounter, which includes face to face time and non-face to face time preparing to see the patient (eg, review of tests), Obtaining and/or reviewing separately obtained history, Documenting  clinical information in the electronic or other health record, Independently interpreting results (not separately reported) and communicating results to the patient/family/caregiver, or Care coordination (not separately reported).          HPI  Review of Systems   All other systems reviewed and are negative.

## 2025-02-24 ENCOUNTER — PATIENT MESSAGE (OUTPATIENT)
Dept: INFECTIOUS DISEASES | Facility: CLINIC | Age: 30
End: 2025-02-24
Payer: COMMERCIAL

## 2025-02-24 ENCOUNTER — HOSPITAL ENCOUNTER (EMERGENCY)
Facility: HOSPITAL | Age: 30
Discharge: HOME | End: 2025-02-24
Attending: EMERGENCY MEDICINE
Payer: COMMERCIAL

## 2025-02-24 VITALS
BODY MASS INDEX: 30.48 KG/M2 | DIASTOLIC BLOOD PRESSURE: 86 MMHG | RESPIRATION RATE: 11 BRPM | TEMPERATURE: 99 F | OXYGEN SATURATION: 100 % | SYSTOLIC BLOOD PRESSURE: 147 MMHG | WEIGHT: 230 LBS | HEIGHT: 73 IN | HEART RATE: 53 BPM

## 2025-02-24 DIAGNOSIS — R51.9 LEFT-SIDED HEADACHE: ICD-10-CM

## 2025-02-24 DIAGNOSIS — J32.0 LEFT MAXILLARY SINUSITIS: ICD-10-CM

## 2025-02-24 DIAGNOSIS — J01.10 ACUTE FRONTAL SINUSITIS, RECURRENCE NOT SPECIFIED: ICD-10-CM

## 2025-02-24 LAB
ALBUMIN SERPL-MCNC: 4 G/DL (ref 3.5–5)
ALBUMIN/GLOB SERPL: 1 RATIO (ref 1.1–2)
ALP SERPL-CCNC: 72 UNIT/L (ref 40–150)
ALT SERPL-CCNC: 14 UNIT/L (ref 0–55)
ANION GAP SERPL CALC-SCNC: 4 MEQ/L
APTT PPP: 38.6 SECONDS (ref 23.2–33.7)
AST SERPL-CCNC: 18 UNIT/L (ref 5–34)
B PERT.PT PRMT NPH QL NAA+NON-PROBE: NOT DETECTED
BASOPHILS # BLD AUTO: 0.01 X10(3)/MCL
BASOPHILS NFR BLD AUTO: 0.2 %
BILIRUB SERPL-MCNC: 0.3 MG/DL
BUN SERPL-MCNC: 15.5 MG/DL (ref 8.9–20.6)
C PNEUM DNA NPH QL NAA+NON-PROBE: NOT DETECTED
CALCIUM SERPL-MCNC: 9.7 MG/DL (ref 8.4–10.2)
CHLORIDE SERPL-SCNC: 101 MMOL/L (ref 98–107)
CO2 SERPL-SCNC: 30 MMOL/L (ref 22–29)
CREAT SERPL-MCNC: 1.04 MG/DL (ref 0.72–1.25)
CREAT/UREA NIT SERPL: 15
EOSINOPHIL # BLD AUTO: 0.17 X10(3)/MCL (ref 0–0.9)
EOSINOPHIL NFR BLD AUTO: 3.8 %
ERYTHROCYTE [DISTWIDTH] IN BLOOD BY AUTOMATED COUNT: 14.1 % (ref 11.5–17)
FLUAV AG UPPER RESP QL IA.RAPID: NOT DETECTED
FLUBV AG UPPER RESP QL IA.RAPID: NOT DETECTED
GFR SERPLBLD CREATININE-BSD FMLA CKD-EPI: >60 ML/MIN/1.73/M2
GLOBULIN SER-MCNC: 4 GM/DL (ref 2.4–3.5)
GLUCOSE SERPL-MCNC: 102 MG/DL (ref 74–100)
HADV DNA NPH QL NAA+NON-PROBE: NOT DETECTED
HCOV 229E RNA NPH QL NAA+NON-PROBE: NOT DETECTED
HCOV HKU1 RNA NPH QL NAA+NON-PROBE: NOT DETECTED
HCOV NL63 RNA NPH QL NAA+NON-PROBE: NOT DETECTED
HCOV OC43 RNA NPH QL NAA+NON-PROBE: NOT DETECTED
HCT VFR BLD AUTO: 43.2 % (ref 42–52)
HGB BLD-MCNC: 12.7 G/DL (ref 14–18)
HMPV RNA NPH QL NAA+NON-PROBE: NOT DETECTED
HPIV1 RNA NPH QL NAA+NON-PROBE: NOT DETECTED
HPIV2 RNA NPH QL NAA+NON-PROBE: NOT DETECTED
HPIV3 RNA NPH QL NAA+NON-PROBE: NOT DETECTED
HPIV4 RNA NPH QL NAA+NON-PROBE: NOT DETECTED
IMM GRANULOCYTES # BLD AUTO: 0 X10(3)/MCL (ref 0–0.04)
IMM GRANULOCYTES NFR BLD AUTO: 0 %
INR PPP: 1
LYMPHOCYTES # BLD AUTO: 0.87 X10(3)/MCL (ref 0.6–4.6)
LYMPHOCYTES NFR BLD AUTO: 19.4 %
M PNEUMO DNA NPH QL NAA+NON-PROBE: NOT DETECTED
MCH RBC QN AUTO: 22.7 PG (ref 27–31)
MCHC RBC AUTO-ENTMCNC: 29.4 G/DL (ref 33–36)
MCV RBC AUTO: 77.3 FL (ref 80–94)
MONOCYTES # BLD AUTO: 0.4 X10(3)/MCL (ref 0.1–1.3)
MONOCYTES NFR BLD AUTO: 8.9 %
NEUTROPHILS # BLD AUTO: 3.04 X10(3)/MCL (ref 2.1–9.2)
NEUTROPHILS NFR BLD AUTO: 67.7 %
NRBC BLD AUTO-RTO: 0 %
PLATELET # BLD AUTO: 255 X10(3)/MCL (ref 130–400)
PMV BLD AUTO: 9.5 FL (ref 7.4–10.4)
POTASSIUM SERPL-SCNC: 4.1 MMOL/L (ref 3.5–5.1)
PROT SERPL-MCNC: 8 GM/DL (ref 6.4–8.3)
PROTHROMBIN TIME: 13.5 SECONDS (ref 12.5–14.5)
RBC # BLD AUTO: 5.59 X10(6)/MCL (ref 4.7–6.1)
RSV A 5' UTR RNA NPH QL NAA+PROBE: NOT DETECTED
RSV RNA NPH QL NAA+NON-PROBE: NOT DETECTED
RV+EV RNA NPH QL NAA+NON-PROBE: NOT DETECTED
SARS-COV-2 RNA RESP QL NAA+PROBE: NOT DETECTED
SODIUM SERPL-SCNC: 135 MMOL/L (ref 136–145)
WBC # BLD AUTO: 4.49 X10(3)/MCL (ref 4.5–11.5)

## 2025-02-24 PROCEDURE — 87798 DETECT AGENT NOS DNA AMP: CPT | Performed by: EMERGENCY MEDICINE

## 2025-02-24 PROCEDURE — 80053 COMPREHEN METABOLIC PANEL: CPT | Performed by: EMERGENCY MEDICINE

## 2025-02-24 PROCEDURE — 63600175 PHARM REV CODE 636 W HCPCS: Performed by: EMERGENCY MEDICINE

## 2025-02-24 PROCEDURE — 85730 THROMBOPLASTIN TIME PARTIAL: CPT | Performed by: EMERGENCY MEDICINE

## 2025-02-24 PROCEDURE — 96361 HYDRATE IV INFUSION ADD-ON: CPT

## 2025-02-24 PROCEDURE — 96374 THER/PROPH/DIAG INJ IV PUSH: CPT

## 2025-02-24 PROCEDURE — 25000003 PHARM REV CODE 250: Performed by: EMERGENCY MEDICINE

## 2025-02-24 PROCEDURE — 0241U COVID/RSV/FLU A&B PCR: CPT | Performed by: EMERGENCY MEDICINE

## 2025-02-24 PROCEDURE — 85025 COMPLETE CBC W/AUTO DIFF WBC: CPT | Performed by: EMERGENCY MEDICINE

## 2025-02-24 PROCEDURE — 96375 TX/PRO/DX INJ NEW DRUG ADDON: CPT

## 2025-02-24 PROCEDURE — A9577 INJ MULTIHANCE: HCPCS | Performed by: EMERGENCY MEDICINE

## 2025-02-24 PROCEDURE — 25500020 PHARM REV CODE 255: Performed by: EMERGENCY MEDICINE

## 2025-02-24 PROCEDURE — 85610 PROTHROMBIN TIME: CPT | Performed by: EMERGENCY MEDICINE

## 2025-02-24 PROCEDURE — 99285 EMERGENCY DEPT VISIT HI MDM: CPT | Mod: 25

## 2025-02-24 RX ORDER — DIPHENHYDRAMINE HYDROCHLORIDE 50 MG/ML
25 INJECTION INTRAMUSCULAR; INTRAVENOUS
Status: COMPLETED | OUTPATIENT
Start: 2025-02-24 | End: 2025-02-24

## 2025-02-24 RX ORDER — CETIRIZINE HYDROCHLORIDE 10 MG/1
10 TABLET ORAL DAILY
Qty: 30 TABLET | Refills: 0 | Status: SHIPPED | OUTPATIENT
Start: 2025-02-24 | End: 2026-02-24

## 2025-02-24 RX ORDER — KETOROLAC TROMETHAMINE 30 MG/ML
30 INJECTION, SOLUTION INTRAMUSCULAR; INTRAVENOUS
Status: COMPLETED | OUTPATIENT
Start: 2025-02-24 | End: 2025-02-24

## 2025-02-24 RX ORDER — PROCHLORPERAZINE EDISYLATE 5 MG/ML
10 INJECTION INTRAMUSCULAR; INTRAVENOUS
Status: COMPLETED | OUTPATIENT
Start: 2025-02-24 | End: 2025-02-24

## 2025-02-24 RX ORDER — DEXAMETHASONE SODIUM PHOSPHATE 4 MG/ML
8 INJECTION, SOLUTION INTRA-ARTICULAR; INTRALESIONAL; INTRAMUSCULAR; INTRAVENOUS; SOFT TISSUE
Status: COMPLETED | OUTPATIENT
Start: 2025-02-24 | End: 2025-02-24

## 2025-02-24 RX ORDER — DICLOFENAC SODIUM 50 MG/1
50 TABLET, DELAYED RELEASE ORAL 3 TIMES DAILY PRN
Qty: 30 TABLET | Refills: 0 | Status: SHIPPED | OUTPATIENT
Start: 2025-02-24 | End: 2025-03-06

## 2025-02-24 RX ORDER — FLUTICASONE PROPIONATE 50 MCG
1 SPRAY, SUSPENSION (ML) NASAL 2 TIMES DAILY
Qty: 15 G | Refills: 0 | Status: SHIPPED | OUTPATIENT
Start: 2025-02-24 | End: 2025-03-26

## 2025-02-24 RX ORDER — AMOXICILLIN AND CLAVULANATE POTASSIUM 875; 125 MG/1; MG/1
1 TABLET, FILM COATED ORAL 2 TIMES DAILY
Qty: 20 TABLET | Refills: 0 | Status: SHIPPED | OUTPATIENT
Start: 2025-02-24 | End: 2025-03-06

## 2025-02-24 RX ADMIN — SODIUM CHLORIDE 1000 ML: 9 INJECTION, SOLUTION INTRAVENOUS at 07:02

## 2025-02-24 RX ADMIN — KETOROLAC TROMETHAMINE 30 MG: 30 INJECTION, SOLUTION INTRAMUSCULAR at 08:02

## 2025-02-24 RX ADMIN — DIPHENHYDRAMINE HYDROCHLORIDE 25 MG: 50 INJECTION INTRAMUSCULAR; INTRAVENOUS at 07:02

## 2025-02-24 RX ADMIN — GADOBENATE DIMEGLUMINE 20 ML: 529 INJECTION, SOLUTION INTRAVENOUS at 08:02

## 2025-02-24 RX ADMIN — PROCHLORPERAZINE EDISYLATE 10 MG: 5 INJECTION INTRAMUSCULAR; INTRAVENOUS at 07:02

## 2025-02-24 RX ADMIN — DEXAMETHASONE SODIUM PHOSPHATE 8 MG: 4 INJECTION, SOLUTION INTRA-ARTICULAR; INTRALESIONAL; INTRAMUSCULAR; INTRAVENOUS; SOFT TISSUE at 08:02

## 2025-02-24 NOTE — ED PROVIDER NOTES
Encounter Date: 2/24/2025       History     Chief Complaint   Patient presents with    Headache     HA with L face pain x 3 days. Ibuprofen and tylenol at home with no relief. Denies vision changes, no focal deficits.      30-year-old male with a history of HIV presents to the emergency department for evaluation of the left-sided frontal headache with the associated left face/sinus pain for the last 3 days.  He denies any associated fever or chills.  Reports transient improvement at home with the acetaminophen, ibuprofen over-the-counter; however, headache returns.  Not associated vision changes.  No neck pain or stiffness.  No extremity numbness, tingling, or weakness.  Reports he has been congestion, discharge for the last 1 week.    The history is provided by the patient and medical records.     Review of patient's allergies indicates:  No Known Allergies  Past Medical History:   Diagnosis Date    ADHD (attention deficit hyperactivity disorder)     Anemia     HIV infection     Newly diagnosed HIV-positive 06/13/2024     Past Surgical History:   Procedure Laterality Date    WISDOM TOOTH EXTRACTION       Family History   Problem Relation Name Age of Onset    Kidney disease Maternal Grandfather Ramón Covington      Social History[1]  Review of Systems   Constitutional:  Negative for fever.   HENT:  Positive for congestion.    Neurological:  Positive for headaches.       Physical Exam     Initial Vitals [02/24/25 0702]   BP Pulse Resp Temp SpO2   (!) 206/105 (!) 53 18 98.6 °F (37 °C) 100 %      MAP       --         Physical Exam    Nursing note and vitals reviewed.  Constitutional: He appears well-developed and well-nourished. No distress.   HENT:   Head: Normocephalic and atraumatic.   Left naris congestion   Eyes: EOM are normal. Pupils are equal, round, and reactive to light. No scleral icterus.   Neck: Neck supple.   Full active range of motion, no meningismus/rigidity   Cardiovascular:  Normal rate and regular  rhythm.           Pulmonary/Chest: Breath sounds normal. No respiratory distress.   Musculoskeletal:      Cervical back: Neck supple.     Neurological: He is alert and oriented to person, place, and time. He has normal strength. No cranial nerve deficit or sensory deficit. GCS score is 15. GCS eye subscore is 4. GCS verbal subscore is 5. GCS motor subscore is 6.   Skin: Skin is warm and dry.         ED Course   Procedures  Labs Reviewed   COMPREHENSIVE METABOLIC PANEL - Abnormal       Result Value    Sodium 135 (*)     Potassium 4.1      Chloride 101      CO2 30 (*)     Glucose 102 (*)     Blood Urea Nitrogen 15.5      Creatinine 1.04      Calcium 9.7      Protein Total 8.0      Albumin 4.0      Globulin 4.0 (*)     Albumin/Globulin Ratio 1.0 (*)     Bilirubin Total 0.3      ALP 72      ALT 14      AST 18      eGFR >60      Anion Gap 4.0      BUN/Creatinine Ratio 15     CBC WITH DIFFERENTIAL - Abnormal    WBC 4.49 (*)     RBC 5.59      Hgb 12.7 (*)     Hct 43.2      MCV 77.3 (*)     MCH 22.7 (*)     MCHC 29.4 (*)     RDW 14.1      Platelet 255      MPV 9.5      Neut % 67.7      Lymph % 19.4      Mono % 8.9      Eos % 3.8      Basophil % 0.2      Imm Grans % 0.0      Neut # 3.04      Lymph # 0.87      Mono # 0.40      Eos # 0.17      Baso # 0.01      Imm Gran # 0.00      NRBC% 0.0     APTT - Abnormal    PTT 38.6 (*)    COVID/RSV/FLU A&B PCR - Normal    Influenza A PCR Not Detected      Influenza B PCR Not Detected      Respiratory Syncytial Virus PCR Not Detected      SARS-CoV-2 PCR Not Detected      Narrative:     The Xpert Xpress SARS-CoV-2/FLU/RSV plus is a rapid, multiplexed real-time PCR test intended for the simultaneous qualitative detection and differentiation of SARS-CoV-2, Influenza A, Influenza B, and respiratory syncytial virus (RSV) viral RNA in either nasopharyngeal swab or nasal swab specimens.         RESPIRATORY PANEL - Normal    Adenovirus Not Detected      Coronavirus 229E Not Detected       Coronavirus HKU1 Not Detected      Coronavirus NL63 Not Detected      Coronavirus OC43 PCR, Common Cold Virus Not Detected      Human Metapneumovirus Not Detected      Parainfluenza Virus 1 Not Detected      Parainfluenza Virus 2 Not Detected      Parainfluenza Virus 3 Not Detected      Parainfluenza Virus 4 Not Detected      Bordetella pertussis (ptxP) Not Detected      Chlamydia pneumoniae Not Detected      Mycoplasma pneumoniae Not Detected      Human Rhinovirus/Enterovirus Not Detected      Bordetella parapertussis (IL3808) Not Detected      Narrative:     The BioFire Respiratory Panel 2.1 (RP2.1) is a PCR-based multiplexed nucleic acid test intended for use with the BioFire® 2.0 for simultaneous qualitative detection and identification of multiple respiratory viral and bacterial nucleic acids in nasopharyngeal swabs (NPS) obtained from individuals suspected of respiratory tract infections.   PROTIME-INR - Normal    PT 13.5      INR 1.0      Narrative:     Protimes are used to monitor anticoagulant agents such as warfarin. PT INR values are based on the current patient normal mean and the KIARA value for the specific instrument reagent used.  **Routine theraputic target values for the INR are 2.0-3.0**   CBC W/ AUTO DIFFERENTIAL    Narrative:     The following orders were created for panel order CBC auto differential.  Procedure                               Abnormality         Status                     ---------                               -----------         ------                     CBC with Differential[[id number]]       Abnormal            Final result                 Please view results for these tests on the individual orders.          Imaging Results              MRI Brain W WO Contrast (Final result)  Result time 02/24/25 09:06:52      Final result by Adam Alcaraz MD (02/24/25 09:06:52)                   Impression:      Unremarkable evaluation of the brain parenchyma.    Left ostiomeatal unit  pattern of obstruction causing left maxillary, left anterior ethmoid, and left frontal sinusitis.      Electronically signed by: Adam Alcaraz  Date:    02/24/2025  Time:    09:06               Narrative:    EXAMINATION:  MRI BRAIN W WO CONTRAST    CLINICAL HISTORY:  Headache, new or worsening, immunodeficiency (Age 19-49y);new onset severe headache x 3 days, abnormal brain CT, possible vascular abnormality per radiology, MRI  w/ w/o recommended by rads;    TECHNIQUE:  Multiplanar multisequence MR imaging of the brain was performed before and after the administration of 20 mL MultiHance intravenous contrast.    COMPARISON:  CT head without contrast 02/24/2025    FINDINGS:  INTRACRANIAL: Incidental subcentimeter dilated perivascular spaces mostly noted within the frontal and parietal subcortical white matter which are of no clinical significance.  No pathological signal abnormality of the brain parenchyma.  No abnormal enhancement demonstrated.  No parenchymal restricted diffusion.  No evidence of intracranial hemorrhage.  No extra-axial fluid collection or mass.  No intracranial mass effect.  No hydrocephalus.  Midline structures have a normal configuration.  Visualized pituitary gland and infundibulum are normal.  Visualized major intracranial vascular structures demonstrate normal flow voids and are normal in course and caliber.    SINUSES: Left maxillary, left anterior ethmoid, and left frontal sinusitis which is consistent with a left ostiomeatal unit pattern of obstruction.    ORBITS: Visualized orbits are normal.                                       CT Head Without Contrast (Final result)  Result time 02/24/25 07:46:44      Final result by Elkin Aldridge MD (02/24/25 07:46:44)                   Impression:      1.  No acute intracranial findings identified.    2.  Left frontal lobe somewhat curvilinear subtle hypodensities may represent developmental asymmetric prominence of the sulci without exclusion of  underlying vascular abnormality.  These may be further assessed with MRI brain without and with contrast.    3.  Severe sinusitis changes.      Electronically signed by: Elkin Aldridge  Date:    02/24/2025  Time:    07:46               Narrative:    EXAMINATION:  CT HEAD WITHOUT CONTRAST    CLINICAL HISTORY:  Headache, new or worsening, immunodeficiency (Age 19-49y);    TECHNIQUE:  Sequential axial images were performed of the brain without contrast.    Dose product length of 1205 mGycm. Automated exposure control was utilized to minimize radiation dose.    COMPARISON:  None available.    FINDINGS:  There is no intracranial mass effect, midline shift, hydrocephalus or hemorrhage. There is no sulcal effacement or low attenuation changes to suggest recent large vessel territory infarction.  There are left frontal lobe subtle somewhat curvilinear hyperdensities which could be related to underlying vascular abnormality and is seen from image 15 to 17 series 16 and image 38 series 5.  There is no acute extra axial fluid collection.    There is complete loss of pneumatization of the left maxillary, ethmoidal, left frontal and portion of the right frontal sinus by mucoperiosteal thickening with possibility of sinonasal polyposis.  Otherwise, visualized paranasal sinuses are clear without mucosal thickening, polypoidal abnormality or air-fluid levels. Mastoid air cells aeration is optimal.                                       Medications   prochlorperazine injection Soln 10 mg (10 mg Intravenous Given 2/24/25 0738)   diphenhydrAMINE injection 25 mg (25 mg Intravenous Given 2/24/25 0738)   sodium chloride 0.9% bolus 1,000 mL 1,000 mL (0 mLs Intravenous Stopped 2/24/25 0949)   ketorolac injection 30 mg (30 mg Intravenous Given 2/24/25 0807)   dexAMETHasone injection 8 mg (8 mg Intravenous Given 2/24/25 0807)   gadobenate dimeglumine (MULTIHANCE) injection 20 mL (20 mLs Intravenous Given 2/24/25 0845)     Medical Decision  Making  Problems Addressed:  Acute frontal sinusitis, recurrence not specified: acute illness or injury  Left maxillary sinusitis: acute illness or injury  Left-sided headache: acute illness or injury    Amount and/or Complexity of Data Reviewed  Labs: ordered.  Radiology: ordered.    Risk  OTC drugs.  Prescription drug management.      ED assessment:    Mr. Covington presented with a left-sided headache for the last several days with sinus congestion for the last week.  History of HIV but undetectable virus levels on last labs.  Afebrile, nontoxic appearing, hemodynamically stable.  Focal headache in the left frontotemporal/sinus region.  No alteration in mentation nor focal neurologic deficits.  No meningismus.    Differential diagnosis (including but not limited to):   Tension headache, sinusitis, migraine headache, acute viral syndrome.    Given progressive severity, consider spontaneous ICH  Patient with no meningismus, alteration in sensorium, fever, nor focal neuro deficits to suggest acute CNS infectious process.     ED management:   Patient feeling much improved following medications administered in the emergency department.  Non contrast CT with findings as above.  MRI with no acute parenchymal, vascular findings; however, note of significant sinus disease.  Discussed this with the patient.  Will prescribe fluticasone nasal spray, NSAIDs, Zyrtec and Augmentin course to help with sinusitis, suspected etiology of his symptoms.  Advised may benefit from ENT evaluation as well as patient reports recurrent sinus issues in the past.  ED return precautions reviewed at the bedside and provided in the written discharge instructions. All questions answered to the best of my ability.       My independent radiology interpretation:   CT head: no acute intracranial hemorrhage, ++ sinus congestion    Amount and/or Complexity of Data Reviewed  Independent historian: none   Summary of history:   External data reviewed: notes  from clinic visits  Summary of data reviewed:   Follows outpatient with infectious disease clinic with regards to HIV disease.  Last several viral load undetectable.  Last CD4 count 210 in December.  Risk and benefits of testing: discussed   Labs: ordered and reviewed  Radiology: ordered and independent interpretation performed (see above or ED course)    Risk  Prescription drug management   Shared decision making     Critical Care  none    I, Laurie Hope MD personally performed the history, PE, MDM, and procedures as documented above and agree with the scribe's documentation.              ED Course as of 02/24/25 1003   Mon Feb 24, 2025   0754 CT head demonstrates extensive left sinusitis type changes; however, also with possibility of vascular malformation in the left frontal lobe with MRI with and without contrast recommended by Radiology.  Given new onset, severe headache, will obtain additional imaging in the emergency department.  Patient informed of finding and he is agreeable to proceed. [KS]      ED Course User Index  [KS] Laurie Hope MD                           Clinical Impression:  Final diagnoses:  [J32.0] Left maxillary sinusitis  [J01.10] Acute frontal sinusitis, recurrence not specified  [R51.9] Left-sided headache          ED Disposition Condition    Discharge Stable          ED Prescriptions       Medication Sig Dispense Start Date End Date Auth. Provider    diclofenac (VOLTAREN) 50 MG EC tablet Take 1 tablet (50 mg total) by mouth 3 (three) times daily as needed (pain). 30 tablet 2/24/2025 3/6/2025 Laurie Hope MD    fluticasone propionate (FLONASE) 50 mcg/actuation nasal spray 1 spray (50 mcg total) by Each Nostril route 2 (two) times a day. 15 g 2/24/2025 3/26/2025 Laurie Hope MD    amoxicillin-clavulanate 875-125mg (AUGMENTIN) 875-125 mg per tablet Take 1 tablet by mouth 2 (two) times daily. for 10 days 20 tablet 2/24/2025 3/6/2025 Laurie Hope MD    cetirizine (ZYRTEC)  10 MG tablet Take 1 tablet (10 mg total) by mouth once daily. 30 tablet 2/24/2025 2/24/2026 Laurie Hope MD          Follow-up Information       Follow up With Specialties Details Why Contact Info    Your primary care physician  Schedule an appointment as soon as possible for a visit   Call your primary care physician or you can call [phone] to schedule with a primary care physician    [facility] - Emergency Dept Emergency Medicine  As needed, If symptoms worsen [address] [facility] [id number] [phone]    Sindy Salinas MD Otolaryngology  This is a local ENT.  Consider follow up with the ENT or any other ENT clinic of your choice to discuss your recurrent sinusitis issues [address] [facility] [id number] [phone]                 [1]   Social History  Tobacco Use    Smoking status: Former     Current packs/day: 0.25     Average packs/day: 0.3 packs/day for 2.0 years (0.5 ttl pk-yrs)     Types: Cigarettes    Smokeless tobacco: Former   Substance Use Topics    Alcohol use: Yes     Alcohol/week: 2.0 standard drinks of alcohol     Types: 2 Glasses of wine per week     Comment: ocassional    Drug use: Never        Laurie Hope MD  02/24/25 1002

## 2025-02-26 DIAGNOSIS — J32.0 CHRONIC SINUSITIS OF BOTH MAXILLARY SINUSES: Primary | ICD-10-CM

## 2025-03-06 ENCOUNTER — OFFICE VISIT (OUTPATIENT)
Dept: OTOLARYNGOLOGY | Facility: CLINIC | Age: 30
End: 2025-03-06
Payer: COMMERCIAL

## 2025-03-06 DIAGNOSIS — J32.9 CHRONIC SINUSITIS, UNSPECIFIED LOCATION: Primary | ICD-10-CM

## 2025-03-06 DIAGNOSIS — R51.9 LEFT FACIAL PRESSURE AND PAIN: ICD-10-CM

## 2025-03-06 DIAGNOSIS — R09.81 NASAL CONGESTION: ICD-10-CM

## 2025-03-06 RX ORDER — PREDNISONE 20 MG/1
20 TABLET ORAL SEE ADMIN INSTRUCTIONS
Qty: 21 TABLET | Refills: 0 | Status: SHIPPED | OUTPATIENT
Start: 2025-03-06 | End: 2025-03-20

## 2025-03-06 RX ORDER — AMOXICILLIN AND CLAVULANATE POTASSIUM 875; 125 MG/1; MG/1
1 TABLET, FILM COATED ORAL 2 TIMES DAILY
Qty: 20 TABLET | Refills: 0 | Status: SHIPPED | OUTPATIENT
Start: 2025-03-06 | End: 2025-03-16

## 2025-03-06 NOTE — PROGRESS NOTES
Audio Only Telehealth Visit     The patient location is: state of [place]  The chief complaint leading to consultation is: sinus concerns  Visit type: Virtual visit with audio only (telephone)  Total time spent on medical discussion: 15 min  Total time spent on visit: 25 min     The reason for the audio only service rather than synchronous audio and video virtual visit was related to technical difficulties or patient preference/necessity.     Each patient to whom I provide medical services by telemedicine is:  (1) informed of the relationship between the physician and patient and the respective role of any other health care provider with respect to management of the patient; and (2) notified that they may decline to receive medical services by telemedicine and may withdraw from such care at any time. Patient verbally consented to receive this service via voice-only telephone call.       HPI: 03/06/2025: 30 y.o. male referred for sinus concerns. States he has been having bad headaches recently, last week it became so severe that he went to the ED. Endorses nasal congestion, rhinorrhea, & PND. Also reports left sided facial pressure/pain from the top of his head down to the level of his nose. Placed on 10 day course of augmentin along with zyrtec & flonase 2/24/25. States he has not been using flonase daily but has been taking abx + zyrtec & feels symptoms have improved some though not resolved. Has never used sinus rinses. He does not often take abx for sinus infections. 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     Imaging:     ________________________________________________________________________________________________       Assessment and plan:  30 y.o. male referred for sinus concerns. CT head 2/24/25 with opacification of left frontal, anterior ethmoid, & left maxillary sinuses.    - NSI BID  - Follow with flonase BID  - Extend  course of augmentin x 20 days total  - Prednisone taper  - Cont. zyrtec  - Telemed 3 weeks. If no significant improvement, will consider dedicated CT sinus      Uma Levi NP      This service was not originating from a related E/M service provided within the previous 7 days nor will  to an E/M service or procedure within the next 24 hours or my soonest available appointment.  Prevailing standard of care was able to be met in this audio-only visit.

## 2025-04-23 DIAGNOSIS — Z21 HIV INFECTION, UNSPECIFIED SYMPTOM STATUS: Primary | ICD-10-CM

## 2025-05-12 ENCOUNTER — HOSPITAL ENCOUNTER (OUTPATIENT)
Dept: RADIOLOGY | Facility: HOSPITAL | Age: 30
Discharge: HOME | End: 2025-05-12
Attending: OTOLARYNGOLOGY
Payer: COMMERCIAL

## 2025-05-12 DIAGNOSIS — Z79.01 LONG TERM (CURRENT) USE OF ANTICOAGULANTS: ICD-10-CM

## 2025-05-12 DIAGNOSIS — J32.4 CHRONIC PANSINUSITIS: ICD-10-CM

## 2025-05-12 DIAGNOSIS — Z01.818 OTHER SPECIFIED PRE-OPERATIVE EXAMINATION: ICD-10-CM

## 2025-05-12 DIAGNOSIS — Z01.818 OTHER SPECIFIED PRE-OPERATIVE EXAMINATION: Primary | ICD-10-CM

## 2025-05-12 PROCEDURE — 71046 X-RAY EXAM CHEST 2 VIEWS: CPT | Mod: TC

## 2025-05-22 ENCOUNTER — ANESTHESIA EVENT (OUTPATIENT)
Dept: SURGERY | Facility: HOSPITAL | Age: 30
End: 2025-05-22
Payer: COMMERCIAL

## 2025-05-22 ENCOUNTER — HOSPITAL ENCOUNTER (OUTPATIENT)
Facility: HOSPITAL | Age: 30
Discharge: HOME | End: 2025-05-22
Attending: OTOLARYNGOLOGY | Admitting: OTOLARYNGOLOGY
Payer: COMMERCIAL

## 2025-05-22 ENCOUNTER — ANESTHESIA (OUTPATIENT)
Dept: SURGERY | Facility: HOSPITAL | Age: 30
End: 2025-05-22
Payer: COMMERCIAL

## 2025-05-22 VITALS
WEIGHT: 229.25 LBS | TEMPERATURE: 98 F | HEART RATE: 82 BPM | OXYGEN SATURATION: 95 % | RESPIRATION RATE: 18 BRPM | HEIGHT: 74 IN | DIASTOLIC BLOOD PRESSURE: 82 MMHG | BODY MASS INDEX: 29.42 KG/M2 | SYSTOLIC BLOOD PRESSURE: 153 MMHG

## 2025-05-22 DIAGNOSIS — J32.4 CHRONIC PANSINUSITIS: ICD-10-CM

## 2025-05-22 PROCEDURE — 36000708 HC OR TIME LEV III 1ST 15 MIN: Performed by: OTOLARYNGOLOGY

## 2025-05-22 PROCEDURE — 25000003 PHARM REV CODE 250: Performed by: NURSE ANESTHETIST, CERTIFIED REGISTERED

## 2025-05-22 PROCEDURE — 87077 CULTURE AEROBIC IDENTIFY: CPT | Performed by: OTOLARYNGOLOGY

## 2025-05-22 PROCEDURE — 87077 CULTURE AEROBIC IDENTIFY: CPT | Mod: 59 | Performed by: OTOLARYNGOLOGY

## 2025-05-22 PROCEDURE — 87075 CULTR BACTERIA EXCEPT BLOOD: CPT | Performed by: OTOLARYNGOLOGY

## 2025-05-22 PROCEDURE — 71000033 HC RECOVERY, INTIAL HOUR: Performed by: OTOLARYNGOLOGY

## 2025-05-22 PROCEDURE — 63600175 PHARM REV CODE 636 W HCPCS: Performed by: ANESTHESIOLOGY

## 2025-05-22 PROCEDURE — 71000016 HC POSTOP RECOV ADDL HR: Performed by: OTOLARYNGOLOGY

## 2025-05-22 PROCEDURE — 63600175 PHARM REV CODE 636 W HCPCS: Performed by: NURSE ANESTHETIST, CERTIFIED REGISTERED

## 2025-05-22 PROCEDURE — 87077 CULTURE AEROBIC IDENTIFY: CPT

## 2025-05-22 PROCEDURE — 87102 FUNGUS ISOLATION CULTURE: CPT | Performed by: OTOLARYNGOLOGY

## 2025-05-22 PROCEDURE — 36000709 HC OR TIME LEV III EA ADD 15 MIN: Performed by: OTOLARYNGOLOGY

## 2025-05-22 PROCEDURE — 87185 SC STD ENZYME DETCJ PER NZM: CPT | Performed by: OTOLARYNGOLOGY

## 2025-05-22 PROCEDURE — 71000015 HC POSTOP RECOV 1ST HR: Performed by: OTOLARYNGOLOGY

## 2025-05-22 PROCEDURE — 63600175 PHARM REV CODE 636 W HCPCS: Performed by: OTOLARYNGOLOGY

## 2025-05-22 PROCEDURE — 87186 SC STD MICRODIL/AGAR DIL: CPT

## 2025-05-22 PROCEDURE — 63600175 PHARM REV CODE 636 W HCPCS

## 2025-05-22 PROCEDURE — C9046 COCAINE HCL NASAL SOLUTION: HCPCS | Performed by: OTOLARYNGOLOGY

## 2025-05-22 PROCEDURE — 37000008 HC ANESTHESIA 1ST 15 MINUTES: Performed by: OTOLARYNGOLOGY

## 2025-05-22 PROCEDURE — 71000039 HC RECOVERY, EACH ADD'L HOUR: Performed by: OTOLARYNGOLOGY

## 2025-05-22 PROCEDURE — 37000009 HC ANESTHESIA EA ADD 15 MINS: Performed by: OTOLARYNGOLOGY

## 2025-05-22 PROCEDURE — 27201423 OPTIME MED/SURG SUP & DEVICES STERILE SUPPLY: Performed by: OTOLARYNGOLOGY

## 2025-05-22 PROCEDURE — 25000003 PHARM REV CODE 250: Performed by: OTOLARYNGOLOGY

## 2025-05-22 RX ORDER — HYDROMORPHONE HYDROCHLORIDE 2 MG/ML
0.4 INJECTION, SOLUTION INTRAMUSCULAR; INTRAVENOUS; SUBCUTANEOUS EVERY 5 MIN PRN
Status: COMPLETED | OUTPATIENT
Start: 2025-05-22 | End: 2025-05-22

## 2025-05-22 RX ORDER — HYDROCODONE BITARTRATE AND ACETAMINOPHEN 7.5; 325 MG/1; MG/1
1 TABLET ORAL EVERY 4 HOURS PRN
Refills: 0 | Status: DISCONTINUED | OUTPATIENT
Start: 2025-05-22 | End: 2025-05-22

## 2025-05-22 RX ORDER — FENTANYL CITRATE 50 UG/ML
INJECTION, SOLUTION INTRAMUSCULAR; INTRAVENOUS
Status: DISCONTINUED | OUTPATIENT
Start: 2025-05-22 | End: 2025-05-22

## 2025-05-22 RX ORDER — LABETALOL HYDROCHLORIDE 5 MG/ML
INJECTION, SOLUTION INTRAVENOUS
Status: DISCONTINUED
Start: 2025-05-22 | End: 2025-05-22 | Stop reason: HOSPADM

## 2025-05-22 RX ORDER — LIDOCAINE HYDROCHLORIDE AND EPINEPHRINE 10; 10 UG/ML; MG/ML
INJECTION, SOLUTION INFILTRATION; PERINEURAL
Status: DISCONTINUED | OUTPATIENT
Start: 2025-05-22 | End: 2025-05-22 | Stop reason: HOSPADM

## 2025-05-22 RX ORDER — COCAINE HYDROCHLORIDE 40 MG/ML
SOLUTION NASAL
Status: DISCONTINUED | OUTPATIENT
Start: 2025-05-22 | End: 2025-05-22 | Stop reason: HOSPADM

## 2025-05-22 RX ORDER — GLUCAGON 1 MG
1 KIT INJECTION
Status: CANCELLED | OUTPATIENT
Start: 2025-05-22

## 2025-05-22 RX ORDER — CEFAZOLIN SODIUM 1 G/3ML
INJECTION, POWDER, FOR SOLUTION INTRAMUSCULAR; INTRAVENOUS
Status: DISCONTINUED | OUTPATIENT
Start: 2025-05-22 | End: 2025-05-22

## 2025-05-22 RX ORDER — ONDANSETRON 4 MG/1
4 TABLET, ORALLY DISINTEGRATING ORAL ONCE
Status: CANCELLED | OUTPATIENT
Start: 2025-05-22 | End: 2025-05-22

## 2025-05-22 RX ORDER — MIDAZOLAM HYDROCHLORIDE 2 MG/2ML
2 INJECTION, SOLUTION INTRAMUSCULAR; INTRAVENOUS ONCE AS NEEDED
Status: COMPLETED | OUTPATIENT
Start: 2025-05-22 | End: 2025-05-22

## 2025-05-22 RX ORDER — CEFAZOLIN 2 G/1
2 INJECTION, POWDER, FOR SOLUTION INTRAMUSCULAR; INTRAVENOUS
Status: DISCONTINUED | OUTPATIENT
Start: 2025-05-22 | End: 2025-05-22 | Stop reason: HOSPADM

## 2025-05-22 RX ORDER — METHOCARBAMOL 100 MG/ML
1000 INJECTION, SOLUTION INTRAMUSCULAR; INTRAVENOUS ONCE AS NEEDED
Status: COMPLETED | OUTPATIENT
Start: 2025-05-22 | End: 2025-05-22

## 2025-05-22 RX ORDER — SILVER NITRATE 38.21; 12.74 MG/1; MG/1
STICK TOPICAL
Status: DISCONTINUED | OUTPATIENT
Start: 2025-05-22 | End: 2025-05-22 | Stop reason: HOSPADM

## 2025-05-22 RX ORDER — HYDRALAZINE HYDROCHLORIDE 20 MG/ML
10 INJECTION INTRAMUSCULAR; INTRAVENOUS EVERY 6 HOURS PRN
Status: DISCONTINUED | OUTPATIENT
Start: 2025-05-22 | End: 2025-05-22 | Stop reason: HOSPADM

## 2025-05-22 RX ORDER — SODIUM CHLORIDE, SODIUM GLUCONATE, SODIUM ACETATE, POTASSIUM CHLORIDE AND MAGNESIUM CHLORIDE 30; 37; 368; 526; 502 MG/100ML; MG/100ML; MG/100ML; MG/100ML; MG/100ML
INJECTION, SOLUTION INTRAVENOUS CONTINUOUS
Status: CANCELLED | OUTPATIENT
Start: 2025-05-22 | End: 2025-06-21

## 2025-05-22 RX ORDER — LIDOCAINE HYDROCHLORIDE 10 MG/ML
1 INJECTION, SOLUTION EPIDURAL; INFILTRATION; INTRACAUDAL; PERINEURAL ONCE
Status: CANCELLED | OUTPATIENT
Start: 2025-05-22 | End: 2025-05-22

## 2025-05-22 RX ORDER — SILVER NITRATE 38.21; 12.74 MG/1; MG/1
STICK TOPICAL
Status: DISCONTINUED
Start: 2025-05-22 | End: 2025-05-22 | Stop reason: HOSPADM

## 2025-05-22 RX ORDER — PROPOFOL 10 MG/ML
VIAL (ML) INTRAVENOUS
Status: DISCONTINUED | OUTPATIENT
Start: 2025-05-22 | End: 2025-05-22

## 2025-05-22 RX ORDER — DIPHENHYDRAMINE HYDROCHLORIDE 50 MG/ML
25 INJECTION, SOLUTION INTRAMUSCULAR; INTRAVENOUS EVERY 6 HOURS PRN
Status: DISCONTINUED | OUTPATIENT
Start: 2025-05-22 | End: 2025-05-22 | Stop reason: HOSPADM

## 2025-05-22 RX ORDER — LIDOCAINE HYDROCHLORIDE AND EPINEPHRINE 10; 10 UG/ML; MG/ML
INJECTION, SOLUTION INFILTRATION; PERINEURAL
Status: DISCONTINUED
Start: 2025-05-22 | End: 2025-05-22 | Stop reason: HOSPADM

## 2025-05-22 RX ORDER — HYDROCODONE BITARTRATE AND ACETAMINOPHEN 7.5; 325 MG/1; MG/1
1 TABLET ORAL EVERY 4 HOURS PRN
Refills: 0 | Status: DISCONTINUED | OUTPATIENT
Start: 2025-05-22 | End: 2025-05-22 | Stop reason: HOSPADM

## 2025-05-22 RX ORDER — LABETALOL HYDROCHLORIDE 5 MG/ML
5 INJECTION, SOLUTION INTRAVENOUS ONCE
Status: COMPLETED | OUTPATIENT
Start: 2025-05-22 | End: 2025-05-22

## 2025-05-22 RX ORDER — LIDOCAINE HYDROCHLORIDE 10 MG/ML
INJECTION, SOLUTION EPIDURAL; INFILTRATION; INTRACAUDAL; PERINEURAL
Status: DISCONTINUED | OUTPATIENT
Start: 2025-05-22 | End: 2025-05-22

## 2025-05-22 RX ORDER — DEXAMETHASONE SODIUM PHOSPHATE 4 MG/ML
INJECTION, SOLUTION INTRA-ARTICULAR; INTRALESIONAL; INTRAMUSCULAR; INTRAVENOUS; SOFT TISSUE
Status: DISCONTINUED | OUTPATIENT
Start: 2025-05-22 | End: 2025-05-22

## 2025-05-22 RX ORDER — METOCLOPRAMIDE HYDROCHLORIDE 5 MG/ML
10 INJECTION INTRAMUSCULAR; INTRAVENOUS EVERY 10 MIN PRN
Status: DISCONTINUED | OUTPATIENT
Start: 2025-05-22 | End: 2025-05-22 | Stop reason: HOSPADM

## 2025-05-22 RX ORDER — ONDANSETRON HYDROCHLORIDE 2 MG/ML
4 INJECTION, SOLUTION INTRAVENOUS DAILY PRN
Status: DISCONTINUED | OUTPATIENT
Start: 2025-05-22 | End: 2025-05-22 | Stop reason: HOSPADM

## 2025-05-22 RX ORDER — OXYMETAZOLINE HCL 0.05 %
2 SPRAY, NON-AEROSOL (ML) NASAL
Status: COMPLETED | OUTPATIENT
Start: 2025-05-22 | End: 2025-05-22

## 2025-05-22 RX ORDER — HYDRALAZINE HYDROCHLORIDE 20 MG/ML
INJECTION INTRAMUSCULAR; INTRAVENOUS
Status: COMPLETED
Start: 2025-05-22 | End: 2025-05-22

## 2025-05-22 RX ORDER — ROCURONIUM BROMIDE 10 MG/ML
INJECTION, SOLUTION INTRAVENOUS
Status: DISCONTINUED | OUTPATIENT
Start: 2025-05-22 | End: 2025-05-22

## 2025-05-22 RX ORDER — COCAINE HYDROCHLORIDE 40 MG/ML
SOLUTION NASAL
Status: DISCONTINUED
Start: 2025-05-22 | End: 2025-05-22 | Stop reason: HOSPADM

## 2025-05-22 RX ADMIN — LABETALOL HYDROCHLORIDE 5 MG: 5 INJECTION, SOLUTION INTRAVENOUS at 01:05

## 2025-05-22 RX ADMIN — ROCURONIUM BROMIDE 50 MG: 10 INJECTION, SOLUTION INTRAVENOUS at 12:05

## 2025-05-22 RX ADMIN — HYDROMORPHONE HYDROCHLORIDE 0.4 MG: 2 INJECTION, SOLUTION INTRAMUSCULAR; INTRAVENOUS; SUBCUTANEOUS at 02:05

## 2025-05-22 RX ADMIN — OXYMETAZOLINE HYDROCHLORIDE 2 SPRAY: 0.05 SPRAY NASAL at 11:05

## 2025-05-22 RX ADMIN — PROPOFOL 100 MG: 10 INJECTION, EMULSION INTRAVENOUS at 12:05

## 2025-05-22 RX ADMIN — HYDRALAZINE HYDROCHLORIDE 10 MG: 20 INJECTION INTRAMUSCULAR; INTRAVENOUS at 03:05

## 2025-05-22 RX ADMIN — HYDRALAZINE HYDROCHLORIDE 10 MG: 20 INJECTION INTRAMUSCULAR; INTRAVENOUS at 02:05

## 2025-05-22 RX ADMIN — LIDOCAINE HYDROCHLORIDE 50 MG: 10 INJECTION, SOLUTION EPIDURAL; INFILTRATION; INTRACAUDAL; PERINEURAL at 12:05

## 2025-05-22 RX ADMIN — ROCURONIUM BROMIDE 30 MG: 10 INJECTION, SOLUTION INTRAVENOUS at 12:05

## 2025-05-22 RX ADMIN — HYDROMORPHONE HYDROCHLORIDE 0.4 MG: 2 INJECTION, SOLUTION INTRAMUSCULAR; INTRAVENOUS; SUBCUTANEOUS at 01:05

## 2025-05-22 RX ADMIN — DEXAMETHASONE SODIUM PHOSPHATE 12 MG: 4 INJECTION, SOLUTION INTRA-ARTICULAR; INTRALESIONAL; INTRAMUSCULAR; INTRAVENOUS; SOFT TISSUE at 12:05

## 2025-05-22 RX ADMIN — CEFAZOLIN 2 G: 330 INJECTION, POWDER, FOR SOLUTION INTRAMUSCULAR; INTRAVENOUS at 12:05

## 2025-05-22 RX ADMIN — PROPOFOL 200 MG: 10 INJECTION, EMULSION INTRAVENOUS at 12:05

## 2025-05-22 RX ADMIN — FENTANYL CITRATE 100 MCG: 50 INJECTION, SOLUTION INTRAMUSCULAR; INTRAVENOUS at 12:05

## 2025-05-22 RX ADMIN — METHOCARBAMOL 1000 MG: 100 INJECTION INTRAMUSCULAR; INTRAVENOUS at 01:05

## 2025-05-22 RX ADMIN — SODIUM CHLORIDE, POTASSIUM CHLORIDE, SODIUM LACTATE AND CALCIUM CHLORIDE: 600; 310; 30; 20 INJECTION, SOLUTION INTRAVENOUS at 12:05

## 2025-05-22 RX ADMIN — HYDROCODONE BITARTRATE AND ACETAMINOPHEN 1 TABLET: 7.5; 325 TABLET ORAL at 05:05

## 2025-05-22 RX ADMIN — MIDAZOLAM HYDROCHLORIDE 2 MG: 1 INJECTION, SOLUTION INTRAMUSCULAR; INTRAVENOUS at 12:05

## 2025-05-22 NOTE — DISCHARGE INSTRUCTIONS
Follow Dr Mcfarland's post op directions,  in the folder you received from his office...          Endoscopic Sinus Surgery Discharge Instructions     What care is needed at home?   Do not blow your nose or sneeze for 7 to 10 days. If you must sneeze, keep your mouth open.  Change your dressing as needed for the next few days. Once the bleeding stops then you will not need a dressing anymore.   Breathe through your mouth for the first few days.  Place an ice pack wrapped in a towel over the painful part. Never put ice right on the skin. Do not leave the ice on more than 10 to 15 minutes at a time. This will help relieve pain and swelling.  Protect your nose from injury.  Avoid activities that put pressure on your face, like bending over or holding your breath.  You can take a bath or shower but make sure it's not too hot.   Ask the doctor when you should start to use a saline rinse for your nose. This may be right after any packing has been removed. You may need to use the rinse 3 to 4 times each day to help prevent crusts from forming inside of your nose.  You can use a humidifier.  Ask the doctor when you can return to your normal activities or return to work.    What follow-up care is needed?   Keep your scheduled follow up appointment.   Your doctor may remove any packing or stents (small tubes) at one of these visits. Follow the instructions your doctor told you.    Will physical activity be limited?   Talk with your doctor about the right amount of activity for you.  Do not lift anything over 10 pounds (4.5 kg) for 2 to 4 weeks.  Do not bend over toward the floor.  Avoid activities that may jerk your head, like playing sports.    What problems could happen?   Infection  Bleeding  Numbness in the nose  Eye problems  Bruising around eyes  Cerebrospinal fluid leaks from nose    When do I need to call the doctor?   Signs of infection. These include a fever of 100.4°F (38°C) or higher, chills.  Lots of bleeding from  your nose  Packing comes out before it should  Upset stomach and throwing up not helped with drugs  Too much pain or headache  Swelling or redness of the eyes or nose  Clear fluid draining from one side of your nose  Cough, shortness of breath, chest pain  You are not feeling better in 2 to 3 days or you are feeling worse

## 2025-05-22 NOTE — ANESTHESIA POSTPROCEDURE EVALUATION
Anesthesia Post Evaluation    Patient: Khris Covington    Procedure(s) Performed: Procedure(s) (LRB):  FESS, WITH NASAL SEPTOPLASTY AND TURBINATE REDUCTION / Bilateral // Possible left propel stent vs Left permanent stent (Bilateral)    Final Anesthesia Type: general      Patient location during evaluation: PACU  Patient participation: Yes- Able to Participate  Level of consciousness: awake and alert and oriented  Post-procedure vital signs: reviewed and stable  Pain management: adequate  Airway patency: patent  HIGINIO mitigation strategies: Verification of full reversal of neuromuscular block  PONV status at discharge: No PONV  Anesthetic complications: no      Cardiovascular status: blood pressure returned to baseline and stable  Respiratory status: spontaneous ventilation and unassisted  Hydration status: euvolemic  Follow-up not needed.  Comments: [facility]              Vitals Value Taken Time   /71 05/22/25 13:32   Temp 36.8 05/22/25 13:37   Pulse 84 05/22/25 13:37   Resp 17 05/22/25 13:37   SpO2 100 % 05/22/25 13:37   Vitals shown include unfiled device data.      No case tracking events are documented in the log.      Pain/Bethany Score: No data recorded

## 2025-05-22 NOTE — ANESTHESIA POSTPROCEDURE EVALUATION
Anesthesia Post Evaluation    Patient: Khris Covington    Procedure(s) Performed: Procedure(s) (LRB):  FESS, WITH NASAL SEPTOPLASTY AND TURBINATE REDUCTION / Bilateral // Possible left propel stent vs Left permanent stent (Bilateral)    Final Anesthesia Type: general      Patient location during evaluation: PACU  Patient participation: No - Unable to Participate, Sedation  Level of consciousness: sedated  Post-procedure vital signs: reviewed and stable  Pain management: adequate  Airway patency: patent  HIGINIO mitigation strategies: Multimodal analgesia  PONV status at discharge: No PONV  Anesthetic complications: no      Cardiovascular status: stable  Respiratory status: nasal cannula  Hydration status: euvolemic  Follow-up not needed.              Vitals Value Taken Time   /82 05/22/25 11:25   Temp 36.7 °C (98 °F) 05/22/25 11:25   Pulse 60 05/22/25 11:25   Resp 18 05/22/25 11:28   SpO2 100 % 05/22/25 11:25         No case tracking events are documented in the log.      Pain/Bethany Score: No data recorded

## 2025-05-22 NOTE — ANESTHESIA PROCEDURE NOTES
Intubation    Date/Time: 5/22/2025 12:24 PM    Performed by: Cesar Sheldon CRNA  Authorized by: Rolf Washington MD    Intubation:     Induction:  Intravenous    Intubated:  Postinduction    Mask Ventilation:  Easy mask    Attempts:  1    Attempted By:  CRNA    Method of Intubation:  Direct    Blade:  Magallanes 2    Laryngeal View Grade: Grade I - full view of cords      Difficult Airway Encountered?: No      Complications:  None    Airway Device Size:  7.5    Style/Cuff Inflation:  Cuffed    Tube secured:  21    Placement Verified By:  Capnometry    Complicating Factors:  None    Findings Post-Intubation:  BS equal bilateral

## 2025-05-25 LAB — BACTERIA SPEC ANAEROBE CULT: NORMAL

## 2025-05-26 LAB — PSYCHE PATHOLOGY RESULT: NORMAL

## 2025-06-04 NOTE — OP NOTE
[facility] [address] [place] [address]    PATIENT NAME:      GUALBERTO ALEJO  YOB: 1995  CSN:               [id number]  MRN:               [id number]  ADMIT DATE:        05/22/2025 10:33:21  PHYSICIAN:         Al Mcfarland                          OPERATIVE REPORT      DATE OF SURGERY:    05/22/2025 02:59:55    SURGEON:  Al Mcfarland    PREOPERATIVE DIAGNOSES:    1.  Chronic recurrent pansinusitis.  2.  Intranasal and intrasinus polyposis.  3.  Nasal septal deviation.  4.  Turbinate hypertrophy.    OPERATION PERFORMED:    1. Functional endoscopic sinus surgery.  2.  Bilateral anterior and posterior ethmoidectomy.  3.  Maxillary antrostomy, septoplasty, turbinoplasty, frontal sinus exploration,   and sphenoidotomy.  4.  Maxillary antrostomy, submucosal resection of inferior turbinates, and   septoplasty.    DESCRIPTION OF PROCEDURE:  With proper consent information, the patient was   brought to the operating room, placed on the operating room table in supine   position.  After satisfactory endotracheal intubation and general anesthesia,   the patient was placed asleep.  The nose was packed with 200 mg of cocaine and   regionally anesthetized with 0.5 cc of 2% xylocaine with epinephrine.  The   septoplasty was done first.  A left hemitransfixion incision was made.  This was   brought back to the perpendicular plate of the ethmoid.  The deviation was   removed, leaving the caudal and dorsal strut of 1 cm.  This was reapproximated   with 4-0 plain catgut and 5-0 chromic.  Sinus was done at that point.  The   infundibulotomy was done.  Anterior and posterior ethmoidectomy was done up to   the fovea ethmoidalis.  The frontal recess was opened widely.  There was some   diffuse mucosal antral disease throughout.  Maxillary antrostomy was done with   backbiting forceps.  This was done  without difficulty.  The anterior wall of the   sphenoid sinus was removed.  There was some mucosal disease in this area as   well.  A submucosal resection of the inferior turbinate was done.  The opposite   side was done identically.  He had disease in this area, significant obstructive   disease in this area.  He tolerated the procedure well.  He was transferred   back to the recovery room awake, alert, and responsive.      Patient is status post FESS. The patient tolerated the procedure well and is discharged home with follow up instructions.   ______________________________  Al MILLER/AQS  DD:  06/04/2025  Time:  12:52PM  DT:  06/04/2025  Time:  04:32PM  Job #:  [id number]      OPERATIVE REPORT

## 2025-06-05 LAB — FUNGUS SPEC CULT: NORMAL

## 2025-06-13 LAB
BACTERIA ISLT AEROBE CULT: ABNORMAL
MAYO GENERIC ORDERABLE RESULT: NORMAL

## 2025-06-14 LAB — BACTERIA TISS AEROBE CULT: ABNORMAL

## 2025-07-15 ENCOUNTER — LAB VISIT (OUTPATIENT)
Dept: LAB | Facility: HOSPITAL | Age: 30
End: 2025-07-15
Payer: COMMERCIAL

## 2025-07-15 DIAGNOSIS — Z11.3 ROUTINE SCREENING FOR STI (SEXUALLY TRANSMITTED INFECTION): ICD-10-CM

## 2025-07-15 DIAGNOSIS — Z21 HIV INFECTION, UNSPECIFIED SYMPTOM STATUS: ICD-10-CM

## 2025-07-15 LAB
ALBUMIN SERPL-MCNC: 3.9 G/DL (ref 3.5–5)
ALBUMIN/GLOB SERPL: 1.1 RATIO (ref 1.1–2)
ALP SERPL-CCNC: 81 UNIT/L (ref 40–150)
ALT SERPL-CCNC: 12 UNIT/L (ref 0–55)
ANION GAP SERPL CALC-SCNC: 6 MEQ/L
AST SERPL-CCNC: 19 UNIT/L (ref 11–45)
BASOPHILS # BLD AUTO: 0.02 X10(3)/MCL
BASOPHILS NFR BLD AUTO: 0.8 %
BILIRUB SERPL-MCNC: 0.2 MG/DL
BUN SERPL-MCNC: 12.2 MG/DL (ref 8.9–20.6)
CALCIUM SERPL-MCNC: 9.5 MG/DL (ref 8.4–10.2)
CHLORIDE SERPL-SCNC: 104 MMOL/L (ref 98–107)
CHOLEST SERPL-MCNC: 137 MG/DL
CHOLEST/HDLC SERPL: 3 {RATIO} (ref 0–5)
CO2 SERPL-SCNC: 29 MMOL/L (ref 22–29)
CREAT SERPL-MCNC: 1 MG/DL (ref 0.72–1.25)
CREAT/UREA NIT SERPL: 12
EOSINOPHIL # BLD AUTO: 0.14 X10(3)/MCL (ref 0–0.9)
EOSINOPHIL NFR BLD AUTO: 5.7 %
ERYTHROCYTE [DISTWIDTH] IN BLOOD BY AUTOMATED COUNT: 13.9 % (ref 11.5–17)
EST. AVERAGE GLUCOSE BLD GHB EST-MCNC: 114 MG/DL
GFR SERPLBLD CREATININE-BSD FMLA CKD-EPI: >60 ML/MIN/1.73/M2
GLOBULIN SER-MCNC: 3.6 GM/DL (ref 2.4–3.5)
GLUCOSE SERPL-MCNC: 92 MG/DL (ref 74–100)
HBA1C MFR BLD: 5.6 %
HBV CORE AB SERPL QL IA: NONREACTIVE
HBV CORE IGM SERPL QL IA: NONREACTIVE
HBV SURFACE AB SER-ACNC: 216.21 MIU/ML
HBV SURFACE AB SERPL IA-ACNC: REACTIVE M[IU]/ML
HBV SURFACE AG SERPL QL IA: NONREACTIVE
HCT VFR BLD AUTO: 41.3 % (ref 42–52)
HCV AB SERPL QL IA: NONREACTIVE
HDLC SERPL-MCNC: 44 MG/DL (ref 35–60)
HGB BLD-MCNC: 12.1 G/DL (ref 14–18)
IMM GRANULOCYTES # BLD AUTO: 0.01 X10(3)/MCL (ref 0–0.04)
IMM GRANULOCYTES NFR BLD AUTO: 0.4 %
LDLC SERPL CALC-MCNC: 84 MG/DL (ref 50–140)
LYMPHOCYTES # BLD AUTO: 0.7 X10(3)/MCL (ref 0.6–4.6)
LYMPHOCYTES NFR BLD AUTO: 28.3 %
MCH RBC QN AUTO: 23.3 PG (ref 27–31)
MCHC RBC AUTO-ENTMCNC: 29.3 G/DL (ref 33–36)
MCV RBC AUTO: 79.4 FL (ref 80–94)
MONOCYTES # BLD AUTO: 0.22 X10(3)/MCL (ref 0.1–1.3)
MONOCYTES NFR BLD AUTO: 8.9 %
NEUTROPHILS # BLD AUTO: 1.38 X10(3)/MCL (ref 2.1–9.2)
NEUTROPHILS NFR BLD AUTO: 55.9 %
NRBC BLD AUTO-RTO: 0 %
PLATELET # BLD AUTO: 275 X10(3)/MCL (ref 130–400)
PMV BLD AUTO: 9.5 FL (ref 7.4–10.4)
POTASSIUM SERPL-SCNC: 4.7 MMOL/L (ref 3.5–5.1)
PROT SERPL-MCNC: 7.5 GM/DL (ref 6.4–8.3)
RBC # BLD AUTO: 5.2 X10(6)/MCL (ref 4.7–6.1)
SODIUM SERPL-SCNC: 139 MMOL/L (ref 136–145)
T PALLIDUM AB SER QL: NONREACTIVE
TRIGL SERPL-MCNC: 44 MG/DL (ref 34–140)
VLDLC SERPL CALC-MCNC: 9 MG/DL
WBC # BLD AUTO: 2.47 X10(3)/MCL (ref 4.5–11.5)

## 2025-07-15 PROCEDURE — 86704 HEP B CORE ANTIBODY TOTAL: CPT

## 2025-07-15 PROCEDURE — 85025 COMPLETE CBC W/AUTO DIFF WBC: CPT

## 2025-07-15 PROCEDURE — 36415 COLL VENOUS BLD VENIPUNCTURE: CPT

## 2025-07-15 PROCEDURE — 83036 HEMOGLOBIN GLYCOSYLATED A1C: CPT

## 2025-07-15 PROCEDURE — 87340 HEPATITIS B SURFACE AG IA: CPT

## 2025-07-15 PROCEDURE — 86706 HEP B SURFACE ANTIBODY: CPT

## 2025-07-15 PROCEDURE — 86803 HEPATITIS C AB TEST: CPT

## 2025-07-15 PROCEDURE — 86361 T CELL ABSOLUTE COUNT: CPT

## 2025-07-15 PROCEDURE — 87536 HIV-1 QUANT&REVRSE TRNSCRPJ: CPT

## 2025-07-15 PROCEDURE — 80061 LIPID PANEL: CPT

## 2025-07-15 PROCEDURE — 86780 TREPONEMA PALLIDUM: CPT

## 2025-07-15 PROCEDURE — 86705 HEP B CORE ANTIBODY IGM: CPT

## 2025-07-15 PROCEDURE — 80053 COMPREHEN METABOLIC PANEL: CPT

## 2025-07-16 ENCOUNTER — PATIENT MESSAGE (OUTPATIENT)
Dept: INFECTIOUS DISEASES | Facility: CLINIC | Age: 30
End: 2025-07-16
Payer: COMMERCIAL

## 2025-07-16 LAB
AGE: 30
CD3+CD4+ CELLS # SPEC: 153 UNIT/L (ref 589–1505)
CD3+CD4+ CELLS NFR BLD: 22.1 %
HIV1 RNA # SERPL NAA+PROBE: <20 COPIES/ML
HIV1 RNA SERPL NAA+PROBE-LOG#: ABNORMAL {LOG_COPIES}/ML
LYMPHOCYTES # BLD AUTO: 691.6 X10(3)/MCL (ref 1260–5520)
LYMPHOCYTES NFR LN MANUAL: 28 % (ref 28–48)
LYMPHOMA - T-CELL MARKERS SPEC-IMP: ABNORMAL
WBC # BLD AUTO: 2470 /MM3 (ref 4500–11500)

## 2025-08-11 ENCOUNTER — OFFICE VISIT (OUTPATIENT)
Dept: INFECTIOUS DISEASES | Facility: CLINIC | Age: 30
End: 2025-08-11
Payer: COMMERCIAL

## 2025-08-11 VITALS
HEIGHT: 74 IN | OXYGEN SATURATION: 99 % | WEIGHT: 226 LBS | HEART RATE: 59 BPM | RESPIRATION RATE: 19 BRPM | TEMPERATURE: 98 F | SYSTOLIC BLOOD PRESSURE: 130 MMHG | BODY MASS INDEX: 29 KG/M2 | DIASTOLIC BLOOD PRESSURE: 79 MMHG

## 2025-08-11 DIAGNOSIS — Z11.3 ROUTINE SCREENING FOR STI (SEXUALLY TRANSMITTED INFECTION): Primary | ICD-10-CM

## 2025-08-11 DIAGNOSIS — Z23 ENCOUNTER FOR IMMUNIZATION: ICD-10-CM

## 2025-08-11 DIAGNOSIS — Z21 HIV INFECTION, UNSPECIFIED SYMPTOM STATUS: ICD-10-CM

## 2025-08-11 DIAGNOSIS — B20 AIDS DUE TO HIV-I: ICD-10-CM

## 2025-08-11 DIAGNOSIS — Z21 HIV INFECTION, UNSPECIFIED SYMPTOM STATUS: Primary | ICD-10-CM

## 2025-08-11 PROCEDURE — 90651 9VHPV VACCINE 2/3 DOSE IM: CPT | Mod: S$GLB,,,

## 2025-08-11 PROCEDURE — 3075F SYST BP GE 130 - 139MM HG: CPT | Mod: CPTII,S$GLB,,

## 2025-08-11 PROCEDURE — 3044F HG A1C LEVEL LT 7.0%: CPT | Mod: CPTII,S$GLB,,

## 2025-08-11 PROCEDURE — 3078F DIAST BP <80 MM HG: CPT | Mod: CPTII,S$GLB,,

## 2025-08-11 PROCEDURE — 99214 OFFICE O/P EST MOD 30 MIN: CPT | Mod: 25,S$GLB,,

## 2025-08-11 PROCEDURE — 1159F MED LIST DOCD IN RCRD: CPT | Mod: CPTII,S$GLB,,

## 2025-08-11 PROCEDURE — 99999 PR PBB SHADOW E&M-EST. PATIENT-LVL IV: CPT | Mod: PBBFAC,,,

## 2025-08-11 PROCEDURE — 3008F BODY MASS INDEX DOCD: CPT | Mod: CPTII,S$GLB,,

## 2025-08-11 PROCEDURE — 90471 IMMUNIZATION ADMIN: CPT | Mod: S$GLB,,,

## 2025-08-14 PROBLEM — B20 AIDS DUE TO HIV-I: Status: ACTIVE | Noted: 2025-08-14

## 2025-08-14 PROBLEM — Z23 ENCOUNTER FOR IMMUNIZATION: Status: ACTIVE | Noted: 2025-08-14

## 2025-08-18 PROBLEM — Z23 ENCOUNTER FOR IMMUNIZATION: Status: RESOLVED | Noted: 2025-08-14 | Resolved: 2025-08-18

## 2025-08-31 ENCOUNTER — OFFICE VISIT (OUTPATIENT)
Dept: URGENT CARE | Facility: CLINIC | Age: 30
End: 2025-08-31
Payer: COMMERCIAL

## 2025-08-31 VITALS
BODY MASS INDEX: 29 KG/M2 | WEIGHT: 226 LBS | SYSTOLIC BLOOD PRESSURE: 122 MMHG | HEIGHT: 74 IN | HEART RATE: 94 BPM | RESPIRATION RATE: 18 BRPM | TEMPERATURE: 100 F | OXYGEN SATURATION: 97 % | DIASTOLIC BLOOD PRESSURE: 78 MMHG

## 2025-08-31 DIAGNOSIS — H60.312 ACUTE DIFFUSE OTITIS EXTERNA OF LEFT EAR: ICD-10-CM

## 2025-08-31 DIAGNOSIS — R05.9 COUGH, UNSPECIFIED TYPE: Primary | ICD-10-CM

## 2025-08-31 LAB
CTP QC/QA: YES
CTP QC/QA: YES
POC MOLECULAR INFLUENZA A AGN: NEGATIVE
POC MOLECULAR INFLUENZA B AGN: NEGATIVE
SARS-COV+SARS-COV-2 AG RESP QL IA.RAPID: NEGATIVE

## 2025-08-31 PROCEDURE — 87811 SARS-COV-2 COVID19 W/OPTIC: CPT | Mod: QW,,, | Performed by: NURSE PRACTITIONER

## 2025-08-31 PROCEDURE — 87502 INFLUENZA DNA AMP PROBE: CPT | Mod: QW,,, | Performed by: NURSE PRACTITIONER

## 2025-08-31 PROCEDURE — 99203 OFFICE O/P NEW LOW 30 MIN: CPT | Mod: 25,,, | Performed by: NURSE PRACTITIONER

## 2025-08-31 PROCEDURE — 96372 THER/PROPH/DIAG INJ SC/IM: CPT | Mod: ,,, | Performed by: NURSE PRACTITIONER

## 2025-08-31 RX ORDER — OFLOXACIN 3 MG/ML
5 SOLUTION AURICULAR (OTIC) 2 TIMES DAILY
Qty: 10 ML | Refills: 0 | Status: SHIPPED | OUTPATIENT
Start: 2025-08-31 | End: 2025-08-31

## 2025-08-31 RX ORDER — BETAMETHASONE SODIUM PHOSPHATE AND BETAMETHASONE ACETATE 3; 3 MG/ML; MG/ML
12 INJECTION, SUSPENSION INTRA-ARTICULAR; INTRALESIONAL; INTRAMUSCULAR; SOFT TISSUE
Status: COMPLETED | OUTPATIENT
Start: 2025-08-31 | End: 2025-08-31

## 2025-08-31 RX ORDER — OFLOXACIN 3 MG/ML
5 SOLUTION AURICULAR (OTIC) 2 TIMES DAILY
Qty: 10 ML | Refills: 0 | Status: SHIPPED | OUTPATIENT
Start: 2025-08-31 | End: 2025-09-07

## 2025-08-31 RX ORDER — AMOXICILLIN 500 MG/1
500 CAPSULE ORAL 3 TIMES DAILY
Qty: 30 CAPSULE | Refills: 0 | Status: SHIPPED | OUTPATIENT
Start: 2025-08-31 | End: 2025-08-31

## 2025-08-31 RX ORDER — AMOXICILLIN 500 MG/1
500 CAPSULE ORAL 3 TIMES DAILY
Qty: 30 CAPSULE | Refills: 0 | Status: SHIPPED | OUTPATIENT
Start: 2025-08-31 | End: 2025-09-10

## 2025-08-31 RX ADMIN — BETAMETHASONE SODIUM PHOSPHATE AND BETAMETHASONE ACETATE 12 MG: 3; 3 INJECTION, SUSPENSION INTRA-ARTICULAR; INTRALESIONAL; INTRAMUSCULAR; SOFT TISSUE at 12:08

## (undated) DEVICE — SOL NORMAL USPCA 0.9%

## (undated) DEVICE — CONTAINER SPECIMEN SCREW 4OZ

## (undated) DEVICE — TUBE SUCTION MEDI-VAC STERILE

## (undated) DEVICE — NDL SPINAL 22GA 3.5 IN QUINCKE

## (undated) DEVICE — SUPPORT ULNA NERVE PROTECTOR

## (undated) DEVICE — Device

## (undated) DEVICE — KIT ANTIFOG W/SPONG & FLUID

## (undated) DEVICE — GLOVE SIGNATURE MICRO LTX 7

## (undated) DEVICE — SOL NACL IRR 1000ML BTL

## (undated) DEVICE — GLOVE SENSICARE PI GRN 7

## (undated) DEVICE — DRESSING NASOPORE 8CM BIORSRBL

## (undated) DEVICE — SUT PLN GUT 4-0 SC-1SC-1 1

## (undated) DEVICE — PACKING NASOPORE NASAL STD 8CM

## (undated) DEVICE — COVER PROXIMA MAYO STAND

## (undated) DEVICE — KIT SURGIFLO HEMOSTATIC MATRIX

## (undated) DEVICE — GLOVE SIGNATURE MICRO LTX 6.5

## (undated) DEVICE — SET TUBE (1) SMARTSITE PRT 104